# Patient Record
Sex: MALE | Race: WHITE | Employment: OTHER | URBAN - METROPOLITAN AREA
[De-identification: names, ages, dates, MRNs, and addresses within clinical notes are randomized per-mention and may not be internally consistent; named-entity substitution may affect disease eponyms.]

---

## 2017-01-01 ENCOUNTER — LAB ENCOUNTER (OUTPATIENT)
Dept: LAB | Age: 77
End: 2017-01-01
Attending: INTERNAL MEDICINE
Payer: MEDICARE

## 2017-01-01 ENCOUNTER — OFFICE VISIT (OUTPATIENT)
Dept: FAMILY MEDICINE CLINIC | Facility: CLINIC | Age: 77
End: 2017-01-01

## 2017-01-01 ENCOUNTER — APPOINTMENT (OUTPATIENT)
Dept: LAB | Age: 77
End: 2017-01-01
Attending: FAMILY MEDICINE
Payer: MEDICARE

## 2017-01-01 ENCOUNTER — PATIENT MESSAGE (OUTPATIENT)
Dept: FAMILY MEDICINE CLINIC | Facility: CLINIC | Age: 77
End: 2017-01-01

## 2017-01-01 ENCOUNTER — TELEPHONE (OUTPATIENT)
Dept: FAMILY MEDICINE CLINIC | Facility: CLINIC | Age: 77
End: 2017-01-01

## 2017-01-01 ENCOUNTER — HOSPITAL ENCOUNTER (OUTPATIENT)
Dept: GENERAL RADIOLOGY | Age: 77
Discharge: HOME | End: 2017-01-01
Attending: FAMILY MEDICINE
Payer: MEDICARE

## 2017-01-01 ENCOUNTER — APPOINTMENT (OUTPATIENT)
Dept: NUCLEAR MEDICINE | Facility: HOSPITAL | Age: 77
End: 2017-01-01
Attending: EMERGENCY MEDICINE
Payer: MEDICARE

## 2017-01-01 ENCOUNTER — OFFICE VISIT (OUTPATIENT)
Dept: NEUROLOGY | Facility: CLINIC | Age: 77
End: 2017-01-01

## 2017-01-01 ENCOUNTER — HOSPITAL ENCOUNTER (EMERGENCY)
Facility: HOSPITAL | Age: 77
Discharge: HOME | End: 2017-01-01
Attending: EMERGENCY MEDICINE
Payer: MEDICARE

## 2017-01-01 ENCOUNTER — NURSE ONLY (OUTPATIENT)
Dept: ELECTROPHYSIOLOGY | Facility: HOSPITAL | Age: 77
End: 2017-01-01
Attending: FAMILY MEDICINE
Payer: MEDICARE

## 2017-01-01 ENCOUNTER — TELEPHONE (OUTPATIENT)
Dept: NEUROLOGY | Facility: CLINIC | Age: 77
End: 2017-01-01

## 2017-01-01 ENCOUNTER — HOSPITAL ENCOUNTER (OUTPATIENT)
Dept: CT IMAGING | Facility: HOSPITAL | Age: 77
Discharge: HOME | End: 2017-01-01
Attending: FAMILY MEDICINE
Payer: MEDICARE

## 2017-01-01 ENCOUNTER — APPOINTMENT (OUTPATIENT)
Dept: GENERAL RADIOLOGY | Facility: HOSPITAL | Age: 77
End: 2017-01-01
Attending: EMERGENCY MEDICINE
Payer: MEDICARE

## 2017-01-01 ENCOUNTER — HOSPITAL ENCOUNTER (OUTPATIENT)
Dept: LAB | Facility: HOSPITAL | Age: 77
Discharge: HOME | End: 2017-01-01
Attending: INTERNAL MEDICINE
Payer: MEDICARE

## 2017-01-01 ENCOUNTER — LAB ENCOUNTER (OUTPATIENT)
Dept: LAB | Age: 77
End: 2017-01-01
Attending: PHYSICIAN ASSISTANT
Payer: MEDICARE

## 2017-01-01 ENCOUNTER — HOSPITAL ENCOUNTER (OUTPATIENT)
Dept: CV DIAGNOSTICS | Facility: HOSPITAL | Age: 77
Discharge: HOME | End: 2017-01-01
Attending: INTERNAL MEDICINE

## 2017-01-01 VITALS
TEMPERATURE: 99 F | DIASTOLIC BLOOD PRESSURE: 56 MMHG | SYSTOLIC BLOOD PRESSURE: 110 MMHG | HEART RATE: 66 BPM | HEART RATE: 80 BPM | HEIGHT: 66 IN | RESPIRATION RATE: 16 BRPM | BODY MASS INDEX: 23.63 KG/M2 | TEMPERATURE: 98 F | WEIGHT: 142 LBS | OXYGEN SATURATION: 97 % | DIASTOLIC BLOOD PRESSURE: 68 MMHG | SYSTOLIC BLOOD PRESSURE: 94 MMHG | BODY MASS INDEX: 22.82 KG/M2 | WEIGHT: 147 LBS | HEIGHT: 66 IN | RESPIRATION RATE: 16 BRPM

## 2017-01-01 VITALS
BODY MASS INDEX: 22.66 KG/M2 | WEIGHT: 141 LBS | HEIGHT: 66 IN | RESPIRATION RATE: 18 BRPM | TEMPERATURE: 98 F | DIASTOLIC BLOOD PRESSURE: 60 MMHG | SYSTOLIC BLOOD PRESSURE: 98 MMHG | HEART RATE: 79 BPM | OXYGEN SATURATION: 98 %

## 2017-01-01 VITALS
WEIGHT: 152 LBS | HEIGHT: 66 IN | DIASTOLIC BLOOD PRESSURE: 62 MMHG | RESPIRATION RATE: 16 BRPM | BODY MASS INDEX: 24.43 KG/M2 | HEART RATE: 80 BPM | OXYGEN SATURATION: 98 % | TEMPERATURE: 98 F | SYSTOLIC BLOOD PRESSURE: 112 MMHG

## 2017-01-01 VITALS
RESPIRATION RATE: 16 BRPM | DIASTOLIC BLOOD PRESSURE: 60 MMHG | HEIGHT: 66 IN | WEIGHT: 147 LBS | HEART RATE: 80 BPM | SYSTOLIC BLOOD PRESSURE: 112 MMHG | BODY MASS INDEX: 23.63 KG/M2

## 2017-01-01 VITALS
DIASTOLIC BLOOD PRESSURE: 62 MMHG | OXYGEN SATURATION: 97 % | SYSTOLIC BLOOD PRESSURE: 94 MMHG | WEIGHT: 147.06 LBS | TEMPERATURE: 98 F | BODY MASS INDEX: 24 KG/M2 | HEART RATE: 80 BPM | RESPIRATION RATE: 16 BRPM

## 2017-01-01 VITALS
OXYGEN SATURATION: 94 % | DIASTOLIC BLOOD PRESSURE: 78 MMHG | TEMPERATURE: 98 F | HEART RATE: 62 BPM | RESPIRATION RATE: 16 BRPM | WEIGHT: 147 LBS | BODY MASS INDEX: 24 KG/M2 | SYSTOLIC BLOOD PRESSURE: 104 MMHG

## 2017-01-01 VITALS
DIASTOLIC BLOOD PRESSURE: 50 MMHG | HEART RATE: 76 BPM | HEIGHT: 66 IN | WEIGHT: 142 LBS | RESPIRATION RATE: 18 BRPM | OXYGEN SATURATION: 97 % | SYSTOLIC BLOOD PRESSURE: 98 MMHG | TEMPERATURE: 98 F | BODY MASS INDEX: 22.82 KG/M2

## 2017-01-01 VITALS
DIASTOLIC BLOOD PRESSURE: 60 MMHG | TEMPERATURE: 98 F | BODY MASS INDEX: 25 KG/M2 | WEIGHT: 153.19 LBS | RESPIRATION RATE: 18 BRPM | HEART RATE: 80 BPM | SYSTOLIC BLOOD PRESSURE: 110 MMHG

## 2017-01-01 DIAGNOSIS — R33.9 URINE RETENTION: ICD-10-CM

## 2017-01-01 DIAGNOSIS — M54.2 NECK PAIN OF OVER 3 MONTHS DURATION: ICD-10-CM

## 2017-01-01 DIAGNOSIS — I50.20 SYSTOLIC HEART FAILURE, UNSPECIFIED HEART FAILURE CHRONICITY: ICD-10-CM

## 2017-01-01 DIAGNOSIS — I50.9 HF (HEART FAILURE) (HCC): Primary | ICD-10-CM

## 2017-01-01 DIAGNOSIS — R79.9 ELEVATED BUN: ICD-10-CM

## 2017-01-01 DIAGNOSIS — I10 ESSENTIAL HYPERTENSION: ICD-10-CM

## 2017-01-01 DIAGNOSIS — R73.9 HYPERGLYCEMIA: ICD-10-CM

## 2017-01-01 DIAGNOSIS — R60.1 GENERALIZED EDEMA: ICD-10-CM

## 2017-01-01 DIAGNOSIS — K52.1 DIARRHEA DUE TO DRUG: Primary | ICD-10-CM

## 2017-01-01 DIAGNOSIS — R26.81 UNSTEADY GAIT: Primary | ICD-10-CM

## 2017-01-01 DIAGNOSIS — I87.2 VENOUS INSUFFICIENCY OF BOTH LOWER EXTREMITIES: Primary | ICD-10-CM

## 2017-01-01 DIAGNOSIS — E78.5 HYPERLIPIDEMIA, UNSPECIFIED HYPERLIPIDEMIA TYPE: ICD-10-CM

## 2017-01-01 DIAGNOSIS — H61.23 BILATERAL IMPACTED CERUMEN: ICD-10-CM

## 2017-01-01 DIAGNOSIS — N40.0 BENIGN NODULAR PROSTATIC HYPERPLASIA WITHOUT LOWER URINARY TRACT SYMPTOMS: ICD-10-CM

## 2017-01-01 DIAGNOSIS — I50.20 SYSTOLIC HEART FAILURE (HCC): ICD-10-CM

## 2017-01-01 DIAGNOSIS — G45.9 TRANSIENT CEREBRAL ISCHEMIA, UNSPECIFIED TYPE: ICD-10-CM

## 2017-01-01 DIAGNOSIS — I61.9 STROKE DUE TO INTRACEREBRAL HEMORRHAGE (HCC): ICD-10-CM

## 2017-01-01 DIAGNOSIS — N28.9 RENAL INSUFFICIENCY: Primary | ICD-10-CM

## 2017-01-01 DIAGNOSIS — R06.00 DYSPNEA, UNSPECIFIED TYPE: ICD-10-CM

## 2017-01-01 DIAGNOSIS — H10.402 CHRONIC BACTERIAL CONJUNCTIVITIS OF LEFT EYE: ICD-10-CM

## 2017-01-01 DIAGNOSIS — G25.82 STIFF LIMB SYNDROME: ICD-10-CM

## 2017-01-01 DIAGNOSIS — I50.9 CONGESTIVE HEART FAILURE, UNSPECIFIED CONGESTIVE HEART FAILURE CHRONICITY, UNSPECIFIED CONGESTIVE HEART FAILURE TYPE: ICD-10-CM

## 2017-01-01 DIAGNOSIS — R20.2 PARESTHESIAS: ICD-10-CM

## 2017-01-01 DIAGNOSIS — R61 NIGHT SWEATS: Primary | ICD-10-CM

## 2017-01-01 DIAGNOSIS — R26.81 UNSTEADY GAIT: ICD-10-CM

## 2017-01-01 DIAGNOSIS — M10.042 ACUTE IDIOPATHIC GOUT OF LEFT HAND: Primary | ICD-10-CM

## 2017-01-01 DIAGNOSIS — I61.9 STROKE DUE TO INTRACEREBRAL HEMORRHAGE (HCC): Primary | ICD-10-CM

## 2017-01-01 DIAGNOSIS — N18.30 CKD (CHRONIC KIDNEY DISEASE) STAGE 3, GFR 30-59 ML/MIN (HCC): ICD-10-CM

## 2017-01-01 DIAGNOSIS — I50.22 CHRONIC SYSTOLIC CONGESTIVE HEART FAILURE (HCC): ICD-10-CM

## 2017-01-01 DIAGNOSIS — I50.9 HF (HEART FAILURE) (HCC): ICD-10-CM

## 2017-01-01 DIAGNOSIS — N40.1 BENIGN NODULAR PROSTATIC HYPERPLASIA WITH LOWER URINARY TRACT SYMPTOMS: ICD-10-CM

## 2017-01-01 DIAGNOSIS — I48.20 CHRONIC ATRIAL FIBRILLATION (HCC): Primary | ICD-10-CM

## 2017-01-01 DIAGNOSIS — T50.905A WEIGHT LOSS DUE TO MEDICATION: ICD-10-CM

## 2017-01-01 DIAGNOSIS — N18.9 CRI (CHRONIC RENAL INSUFFICIENCY): ICD-10-CM

## 2017-01-01 DIAGNOSIS — I42.0 DILATED CARDIOMYOPATHY (HCC): ICD-10-CM

## 2017-01-01 DIAGNOSIS — Z95.2 HISTORY OF PROSTHETIC MITRAL VALVE: ICD-10-CM

## 2017-01-01 DIAGNOSIS — D64.9 ABSOLUTE ANEMIA: Primary | ICD-10-CM

## 2017-01-01 DIAGNOSIS — J34.89 SINUS DRAINAGE: ICD-10-CM

## 2017-01-01 DIAGNOSIS — R41.3 MEMORY LOSS: ICD-10-CM

## 2017-01-01 DIAGNOSIS — R61 NIGHT SWEATS: ICD-10-CM

## 2017-01-01 DIAGNOSIS — I10 ESSENTIAL HYPERTENSION: Primary | ICD-10-CM

## 2017-01-01 DIAGNOSIS — I48.20 CHRONIC ATRIAL FIBRILLATION (HCC): ICD-10-CM

## 2017-01-01 DIAGNOSIS — IMO0002 DEPRESSION DUE TO STROKE: ICD-10-CM

## 2017-01-01 DIAGNOSIS — F33.1 MODERATE EPISODE OF RECURRENT MAJOR DEPRESSIVE DISORDER (HCC): ICD-10-CM

## 2017-01-01 DIAGNOSIS — R63.4 WEIGHT LOSS DUE TO MEDICATION: ICD-10-CM

## 2017-01-01 DIAGNOSIS — R79.9 ABNORMAL BLOOD CHEMISTRY: ICD-10-CM

## 2017-01-01 DIAGNOSIS — Z00.00 ENCOUNTER FOR ANNUAL HEALTH EXAMINATION: ICD-10-CM

## 2017-01-01 LAB
ALBUMIN SERPL-MCNC: 4.3 G/DL (ref 3.5–4.8)
ALP LIVER SERPL-CCNC: 54 U/L (ref 45–117)
ALT SERPL-CCNC: 27 U/L (ref 17–63)
AST SERPL-CCNC: 16 U/L (ref 15–41)
ATRIAL RATE: 70 BPM
BASOPHILS # BLD AUTO: 0.06 X10(3) UL (ref 0–0.1)
BASOPHILS NFR BLD AUTO: 0.8 %
BILIRUB SERPL-MCNC: 0.5 MG/DL (ref 0.1–2)
BILIRUB UR QL STRIP.AUTO: NEGATIVE
BUN BLD-MCNC: 40 MG/DL (ref 8–20)
BUN BLD-MCNC: 46 MG/DL (ref 8–20)
CALCIUM BLD-MCNC: 9.4 MG/DL (ref 8.3–10.3)
CALCIUM BLD-MCNC: 9.8 MG/DL (ref 8.3–10.3)
CHLORIDE: 106 MMOL/L (ref 101–111)
CHLORIDE: 111 MMOL/L (ref 101–111)
CLARITY UR REFRACT.AUTO: CLEAR
CO2: 26 MMOL/L (ref 22–32)
CO2: 30 MMOL/L (ref 22–32)
COLOR UR AUTO: YELLOW
CREAT BLD-MCNC: 1.81 MG/DL (ref 0.7–1.3)
CREAT BLD-MCNC: 2.36 MG/DL (ref 0.7–1.3)
D-DIMER: 0.57 UG/ML FEU (ref 0–0.49)
EOSINOPHIL # BLD AUTO: 0.28 X10(3) UL (ref 0–0.3)
EOSINOPHIL NFR BLD AUTO: 3.5 %
ERYTHROCYTE [DISTWIDTH] IN BLOOD BY AUTOMATED COUNT: 13.6 % (ref 11.5–16)
GLUCOSE BLD-MCNC: 88 MG/DL (ref 65–99)
GLUCOSE BLD-MCNC: 91 MG/DL (ref 70–99)
GLUCOSE BLD-MCNC: 98 MG/DL (ref 70–99)
GLUCOSE UR STRIP.AUTO-MCNC: NEGATIVE MG/DL
HAV IGM SER QL: 2.3 MG/DL (ref 1.7–3)
HCT VFR BLD AUTO: 40.2 % (ref 37–53)
HGB BLD-MCNC: 13.2 G/DL (ref 13–17)
IMMATURE GRANULOCYTE COUNT: 0.04 X10(3) UL (ref 0–1)
IMMATURE GRANULOCYTE RATIO %: 0.5 %
INR BLD: 1.12 (ref 0.89–1.11)
KETONES UR STRIP.AUTO-MCNC: NEGATIVE MG/DL
LEUKOCYTE ESTERASE UR QL STRIP.AUTO: NEGATIVE
LYMPHOCYTES # BLD AUTO: 2.41 X10(3) UL (ref 0.9–4)
LYMPHOCYTES NFR BLD AUTO: 30.2 %
M PROTEIN MFR SERPL ELPH: 7.5 G/DL (ref 6.1–8.3)
MCH RBC QN AUTO: 30.3 PG (ref 27–33.2)
MCHC RBC AUTO-ENTMCNC: 32.8 G/DL (ref 31–37)
MCV RBC AUTO: 92.4 FL (ref 80–99)
MONOCYTES # BLD AUTO: 0.78 X10(3) UL (ref 0.1–0.6)
MONOCYTES NFR BLD AUTO: 9.8 %
NEUTROPHIL ABS PRELIM: 4.4 X10 (3) UL (ref 1.3–6.7)
NEUTROPHILS # BLD AUTO: 4.4 X10(3) UL (ref 1.3–6.7)
NEUTROPHILS NFR BLD AUTO: 55.2 %
NITRITE UR QL STRIP.AUTO: NEGATIVE
PH UR STRIP.AUTO: 5 [PH] (ref 4.5–8)
PHOSPHATE SERPL-MCNC: 3.5 MG/DL (ref 2.5–4.9)
PLATELET # BLD AUTO: 186 10(3)UL (ref 150–450)
POTASSIUM SERPL-SCNC: 4.2 MMOL/L (ref 3.6–5.1)
POTASSIUM SERPL-SCNC: 4.8 MMOL/L (ref 3.6–5.1)
PRO-BETA NATRIURETIC PEPTIDE: 3029 PG/ML (ref ?–450)
PROT UR STRIP.AUTO-MCNC: NEGATIVE MG/DL
PSA SERPL DL<=0.01 NG/ML-MCNC: 14.5 SECONDS (ref 12–14.3)
Q-T INTERVAL: 462 MS
QRS DURATION: 200 MS
QTC CALCULATION (BEZET): 529 MS
R AXIS: 137 DEGREES
RBC # BLD AUTO: 4.35 X10(6)UL (ref 3.8–5.8)
RBC UR QL AUTO: NEGATIVE
RED CELL DISTRIBUTION WIDTH-SD: 45.7 FL (ref 35.1–46.3)
SODIUM SERPL-SCNC: 143 MMOL/L (ref 136–144)
SODIUM SERPL-SCNC: 147 MMOL/L (ref 136–144)
SP GR UR STRIP.AUTO: 1 (ref 1–1.03)
T AXIS: -2 DEGREES
TROPONIN: <0.046 NG/ML (ref ?–0.05)
UROBILINOGEN UR STRIP.AUTO-MCNC: <2 MG/DL
VENTRICULAR RATE: 79 BPM
WBC # BLD AUTO: 8 X10(3) UL (ref 4–13)

## 2017-01-01 PROCEDURE — 85025 COMPLETE CBC W/AUTO DIFF WBC: CPT

## 2017-01-01 PROCEDURE — 85610 PROTHROMBIN TIME: CPT | Performed by: EMERGENCY MEDICINE

## 2017-01-01 PROCEDURE — 80053 COMPREHEN METABOLIC PANEL: CPT

## 2017-01-01 PROCEDURE — 36415 COLL VENOUS BLD VENIPUNCTURE: CPT

## 2017-01-01 PROCEDURE — 90653 IIV ADJUVANT VACCINE IM: CPT | Performed by: FAMILY MEDICINE

## 2017-01-01 PROCEDURE — 70486 CT MAXILLOFACIAL W/O DYE: CPT

## 2017-01-01 PROCEDURE — 80048 BASIC METABOLIC PNL TOTAL CA: CPT

## 2017-01-01 PROCEDURE — 85025 COMPLETE CBC W/AUTO DIFF WBC: CPT | Performed by: EMERGENCY MEDICINE

## 2017-01-01 PROCEDURE — 84443 ASSAY THYROID STIM HORMONE: CPT

## 2017-01-01 PROCEDURE — 72040 X-RAY EXAM NECK SPINE 2-3 VW: CPT

## 2017-01-01 PROCEDURE — 80061 LIPID PANEL: CPT

## 2017-01-01 PROCEDURE — 83540 ASSAY OF IRON: CPT

## 2017-01-01 PROCEDURE — 99213 OFFICE O/P EST LOW 20 MIN: CPT | Performed by: FAMILY MEDICINE

## 2017-01-01 PROCEDURE — 93005 ELECTROCARDIOGRAM TRACING: CPT

## 2017-01-01 PROCEDURE — 99214 OFFICE O/P EST MOD 30 MIN: CPT | Performed by: FAMILY MEDICINE

## 2017-01-01 PROCEDURE — 99214 OFFICE O/P EST MOD 30 MIN: CPT | Performed by: OTHER

## 2017-01-01 PROCEDURE — 83036 HEMOGLOBIN GLYCOSYLATED A1C: CPT

## 2017-01-01 PROCEDURE — 82728 ASSAY OF FERRITIN: CPT

## 2017-01-01 PROCEDURE — 83550 IRON BINDING TEST: CPT

## 2017-01-01 PROCEDURE — G0008 ADMIN INFLUENZA VIRUS VAC: HCPCS | Performed by: FAMILY MEDICINE

## 2017-01-01 PROCEDURE — 85045 AUTOMATED RETICULOCYTE COUNT: CPT

## 2017-01-01 PROCEDURE — 71010 XR CHEST AP PORTABLE  (CPT=71010): CPT | Performed by: EMERGENCY MEDICINE

## 2017-01-01 PROCEDURE — 85025 COMPLETE CBC W/AUTO DIFF WBC: CPT | Performed by: INTERNAL MEDICINE

## 2017-01-01 PROCEDURE — 83735 ASSAY OF MAGNESIUM: CPT

## 2017-01-01 PROCEDURE — 83880 ASSAY OF NATRIURETIC PEPTIDE: CPT

## 2017-01-01 PROCEDURE — 99285 EMERGENCY DEPT VISIT HI MDM: CPT

## 2017-01-01 PROCEDURE — 84436 ASSAY OF TOTAL THYROXINE: CPT | Performed by: INTERNAL MEDICINE

## 2017-01-01 PROCEDURE — 95816 EEG AWAKE AND DROWSY: CPT | Performed by: OTHER

## 2017-01-01 PROCEDURE — 84100 ASSAY OF PHOSPHORUS: CPT

## 2017-01-01 PROCEDURE — 84443 ASSAY THYROID STIM HORMONE: CPT | Performed by: INTERNAL MEDICINE

## 2017-01-01 PROCEDURE — G0439 PPPS, SUBSEQ VISIT: HCPCS | Performed by: FAMILY MEDICINE

## 2017-01-01 PROCEDURE — 84484 ASSAY OF TROPONIN QUANT: CPT

## 2017-01-01 PROCEDURE — 84480 ASSAY TRIIODOTHYRONINE (T3): CPT | Performed by: INTERNAL MEDICINE

## 2017-01-01 PROCEDURE — 93010 ELECTROCARDIOGRAM REPORT: CPT

## 2017-01-01 PROCEDURE — 83880 ASSAY OF NATRIURETIC PEPTIDE: CPT | Performed by: EMERGENCY MEDICINE

## 2017-01-01 PROCEDURE — 36415 COLL VENOUS BLD VENIPUNCTURE: CPT | Performed by: INTERNAL MEDICINE

## 2017-01-01 PROCEDURE — 80053 COMPREHEN METABOLIC PANEL: CPT | Performed by: INTERNAL MEDICINE

## 2017-01-01 PROCEDURE — 84550 ASSAY OF BLOOD/URIC ACID: CPT | Performed by: INTERNAL MEDICINE

## 2017-01-01 PROCEDURE — 81003 URINALYSIS AUTO W/O SCOPE: CPT | Performed by: EMERGENCY MEDICINE

## 2017-01-01 PROCEDURE — 85378 FIBRIN DEGRADE SEMIQUANT: CPT | Performed by: EMERGENCY MEDICINE

## 2017-01-01 PROCEDURE — 82962 GLUCOSE BLOOD TEST: CPT

## 2017-01-01 PROCEDURE — 78582 LUNG VENTILAT&PERFUS IMAGING: CPT | Performed by: EMERGENCY MEDICINE

## 2017-01-01 PROCEDURE — 80053 COMPREHEN METABOLIC PANEL: CPT | Performed by: EMERGENCY MEDICINE

## 2017-01-01 PROCEDURE — 83735 ASSAY OF MAGNESIUM: CPT | Performed by: INTERNAL MEDICINE

## 2017-01-01 PROCEDURE — 84484 ASSAY OF TROPONIN QUANT: CPT | Performed by: EMERGENCY MEDICINE

## 2017-01-01 PROCEDURE — 96374 THER/PROPH/DIAG INJ IV PUSH: CPT

## 2017-01-01 RX ORDER — SPIRONOLACTONE 25 MG/1
12.5 TABLET ORAL DAILY
Status: CANCELLED | OUTPATIENT
Start: 2017-01-01

## 2017-01-01 RX ORDER — FINASTERIDE 5 MG/1
TABLET, FILM COATED ORAL
Qty: 30 TABLET | Refills: 0 | Status: SHIPPED | OUTPATIENT
Start: 2017-01-01 | End: 2017-01-01

## 2017-01-01 RX ORDER — FINASTERIDE 5 MG/1
TABLET, FILM COATED ORAL
Qty: 90 TABLET | Refills: 0 | Status: SHIPPED | OUTPATIENT
Start: 2017-01-01 | End: 2018-01-01

## 2017-01-01 RX ORDER — CITALOPRAM 10 MG/1
2.5 TABLET ORAL DAILY
COMMUNITY
End: 2017-01-01

## 2017-01-01 RX ORDER — JUNIPER 300 MG
CAPSULE ORAL
Qty: 30 CAPSULE | Refills: 0 | COMMUNITY
Start: 2017-01-01 | End: 2017-01-01

## 2017-01-01 RX ORDER — METOPROLOL SUCCINATE 100 MG/1
100 TABLET, EXTENDED RELEASE ORAL 2 TIMES DAILY
Status: CANCELLED | OUTPATIENT
Start: 2017-01-01

## 2017-01-01 RX ORDER — SERTRALINE HYDROCHLORIDE 25 MG/1
25 TABLET, FILM COATED ORAL DAILY
Qty: 30 TABLET | Refills: 0 | Status: SHIPPED | OUTPATIENT
Start: 2017-01-01 | End: 2017-01-01

## 2017-01-01 RX ORDER — CITALOPRAM HYDROBROMIDE 10 MG/1
TABLET ORAL
Qty: 45 TABLET | Refills: 0 | OUTPATIENT
Start: 2017-01-01

## 2017-01-01 RX ORDER — TORSEMIDE 5 MG/1
10 TABLET ORAL AS NEEDED
Status: ON HOLD | COMMUNITY
End: 2018-01-01

## 2017-01-01 RX ORDER — FINASTERIDE 5 MG/1
TABLET, FILM COATED ORAL
Qty: 90 TABLET | Refills: 0 | Status: SHIPPED | OUTPATIENT
Start: 2017-01-01 | End: 2017-01-01

## 2017-01-01 RX ORDER — SERTRALINE HYDROCHLORIDE 25 MG/1
TABLET, FILM COATED ORAL
Qty: 30 TABLET | Refills: 0 | Status: SHIPPED | OUTPATIENT
Start: 2017-01-01 | End: 2017-01-01

## 2017-01-01 RX ORDER — LISINOPRIL 10 MG/1
10 TABLET ORAL 2 TIMES DAILY
Qty: 60 TABLET | Refills: 2 | COMMUNITY
Start: 2017-01-01 | End: 2017-01-01

## 2017-01-01 RX ORDER — TORSEMIDE 20 MG/1
20 TABLET ORAL DAILY
COMMUNITY
End: 2017-01-01

## 2017-01-01 RX ORDER — ACETAMINOPHEN 325 MG/1
650 TABLET ORAL EVERY 6 HOURS PRN
Status: CANCELLED | OUTPATIENT
Start: 2017-01-01

## 2017-01-01 RX ORDER — FINASTERIDE 5 MG/1
5 TABLET, FILM COATED ORAL
Qty: 30 TABLET | Refills: 12 | COMMUNITY
Start: 2017-01-01 | End: 2017-01-01

## 2017-01-01 RX ORDER — BACLOFEN 10 MG/1
TABLET ORAL
Qty: 135 TABLET | Refills: 0 | Status: SHIPPED | OUTPATIENT
Start: 2017-01-01 | End: 2018-01-01

## 2017-01-01 RX ORDER — FUROSEMIDE 10 MG/ML
40 INJECTION INTRAMUSCULAR; INTRAVENOUS ONCE
Status: DISCONTINUED | OUTPATIENT
Start: 2017-01-01 | End: 2017-01-01

## 2017-01-01 RX ORDER — BACLOFEN 10 MG/1
TABLET ORAL
Qty: 135 TABLET | Refills: 0 | Status: SHIPPED | OUTPATIENT
Start: 2017-01-01 | End: 2017-01-01

## 2017-01-01 RX ORDER — LISINOPRIL 20 MG/1
20 TABLET ORAL
Status: ON HOLD | COMMUNITY
End: 2018-01-01

## 2017-01-01 RX ORDER — FUROSEMIDE 10 MG/ML
80 INJECTION INTRAMUSCULAR; INTRAVENOUS
Status: CANCELLED | OUTPATIENT
Start: 2017-01-01

## 2017-01-01 RX ORDER — TORSEMIDE 20 MG/1
10 TABLET ORAL DAILY
Qty: 45 TABLET | Refills: 0 | Status: SHIPPED
Start: 2017-01-01 | End: 2017-01-01

## 2017-01-01 RX ORDER — SERTRALINE HYDROCHLORIDE 25 MG/1
TABLET, FILM COATED ORAL
Qty: 90 TABLET | Refills: 0 | OUTPATIENT
Start: 2017-01-01

## 2017-01-01 RX ORDER — PENICILLIN V POTASSIUM 500 MG/1
500 TABLET ORAL 4 TIMES DAILY
Qty: 28 TABLET | Refills: 0 | Status: SHIPPED | OUTPATIENT
Start: 2017-01-01 | End: 2017-01-01

## 2017-01-01 RX ORDER — LISINOPRIL 10 MG/1
10 TABLET ORAL 2 TIMES DAILY
Status: CANCELLED | OUTPATIENT
Start: 2017-01-01

## 2017-01-01 RX ORDER — COLCHICINE 0.6 MG/1
0.6 TABLET ORAL DAILY
Qty: 10 TABLET | Refills: 0 | Status: ON HOLD | OUTPATIENT
Start: 2017-01-01 | End: 2018-01-01 | Stop reason: ALTCHOICE

## 2017-01-01 RX ORDER — SERTRALINE HYDROCHLORIDE 25 MG/1
TABLET, FILM COATED ORAL
Qty: 30 TABLET | Refills: 0 | Status: SHIPPED | OUTPATIENT
Start: 2017-01-01 | End: 2017-01-01 | Stop reason: SINTOL

## 2017-01-01 RX ORDER — ESCITALOPRAM OXALATE 10 MG/1
5 TABLET ORAL DAILY
Status: CANCELLED | OUTPATIENT
Start: 2017-01-01

## 2017-01-01 RX ORDER — TORSEMIDE 20 MG/1
40 TABLET ORAL
Status: CANCELLED | OUTPATIENT
Start: 2017-01-01

## 2017-01-01 RX ORDER — LISINOPRIL 20 MG/1
20 TABLET ORAL 2 TIMES DAILY
Qty: 180 TABLET | Refills: 1 | Status: SHIPPED | OUTPATIENT
Start: 2017-01-01 | End: 2017-01-01

## 2017-01-01 RX ORDER — GENTAMICIN SULFATE 3 MG/ML
1 SOLUTION/ DROPS OPHTHALMIC 4 TIMES DAILY
Qty: 5 ML | Refills: 0 | Status: SHIPPED | OUTPATIENT
Start: 2017-01-01 | End: 2017-01-01

## 2017-01-01 RX ORDER — ASPIRIN 325 MG
325 TABLET, DELAYED RELEASE (ENTERIC COATED) ORAL DAILY
Status: CANCELLED | OUTPATIENT
Start: 2017-01-01

## 2017-01-01 RX ORDER — MAGNESIUM OXIDE 400 MG (241.3 MG MAGNESIUM) TABLET
3 TABLET NIGHTLY PRN
Status: CANCELLED | OUTPATIENT
Start: 2017-01-01

## 2017-01-01 RX ORDER — ONDANSETRON 2 MG/ML
8 INJECTION INTRAMUSCULAR; INTRAVENOUS EVERY 6 HOURS PRN
Status: CANCELLED | OUTPATIENT
Start: 2017-01-01

## 2017-01-01 RX ORDER — FUROSEMIDE 10 MG/ML
80 INJECTION INTRAMUSCULAR; INTRAVENOUS ONCE
Status: COMPLETED | OUTPATIENT
Start: 2017-01-01 | End: 2017-01-01

## 2017-01-01 RX ORDER — FINASTERIDE 5 MG/1
5 TABLET, FILM COATED ORAL
Status: CANCELLED | OUTPATIENT
Start: 2017-01-01

## 2017-01-01 RX ORDER — DIGOXIN 125 MCG
125 TABLET ORAL SEE ADMIN INSTRUCTIONS
Status: CANCELLED | OUTPATIENT
Start: 2017-01-01

## 2017-01-04 ENCOUNTER — OFFICE VISIT (OUTPATIENT)
Dept: FAMILY MEDICINE CLINIC | Facility: CLINIC | Age: 77
End: 2017-01-04

## 2017-01-04 ENCOUNTER — APPOINTMENT (OUTPATIENT)
Dept: LAB | Age: 77
End: 2017-01-04
Attending: FAMILY MEDICINE
Payer: MEDICARE

## 2017-01-04 ENCOUNTER — PRIOR ORIGINAL RECORDS (OUTPATIENT)
Dept: OTHER | Age: 77
End: 2017-01-04

## 2017-01-04 VITALS
DIASTOLIC BLOOD PRESSURE: 68 MMHG | OXYGEN SATURATION: 96 % | TEMPERATURE: 98 F | RESPIRATION RATE: 20 BRPM | HEIGHT: 66 IN | HEART RATE: 76 BPM | SYSTOLIC BLOOD PRESSURE: 114 MMHG

## 2017-01-04 DIAGNOSIS — I61.9 STROKE DUE TO INTRACEREBRAL HEMORRHAGE (HCC): Primary | ICD-10-CM

## 2017-01-04 DIAGNOSIS — I10 ESSENTIAL HYPERTENSION: ICD-10-CM

## 2017-01-04 DIAGNOSIS — N18.30 CKD (CHRONIC KIDNEY DISEASE), STAGE III (HCC): ICD-10-CM

## 2017-01-04 LAB
BUN BLD-MCNC: 73 MG/DL (ref 8–20)
CALCIUM BLD-MCNC: 10.1 MG/DL (ref 8.3–10.3)
CHLORIDE: 114 MMOL/L (ref 101–111)
CO2: 25 MMOL/L (ref 22–32)
CREAT BLD-MCNC: 2.77 MG/DL (ref 0.7–1.3)
GLUCOSE BLD-MCNC: 144 MG/DL (ref 70–99)
POTASSIUM SERPL-SCNC: 3.9 MMOL/L (ref 3.6–5.1)
SODIUM SERPL-SCNC: 148 MMOL/L (ref 136–144)

## 2017-01-04 PROCEDURE — 36415 COLL VENOUS BLD VENIPUNCTURE: CPT

## 2017-01-04 PROCEDURE — 80048 BASIC METABOLIC PNL TOTAL CA: CPT

## 2017-01-04 PROCEDURE — 99213 OFFICE O/P EST LOW 20 MIN: CPT | Performed by: FAMILY MEDICINE

## 2017-01-04 NOTE — HISTORICAL OFFICE NOTE
BRE BEAN  : 1940  ACCOUNT:  [id number]  [phone]  PCP: Dr. Dickson Johnson     TODAY'S DATE: 2016  DICTATED BY:  Beth Capone M.D. ]    CHIEF COMPLAINT: [Followup of .  Heart failure, systolic.]    HPI:    [On 2016, prudencio Kirkpatrick 7 149, Height -   66 , BMI - 24.0 ]    CONS: comfortable. HEAD/FACE: normocephalic. EYES: Severe strabismus. ENT: mucosa pink and moist. NECK: jugular venous pressure not elevated. RESP: clear to auscultation and percussion. CHEST/BREASTS: cheloid.  GI: no he capsule by mouth daily                 12/04/15 Vitamin D             2000UNIT  1 tablet by mouth daily                  05/15/15 Finasteride           5MG       1 tab daily                              05/08/14 Vitamin B-Complex               1 tab daily

## 2017-01-04 NOTE — PROGRESS NOTES
[place] paperwork  Wife needs [facility] paperwork due to [address] stroke and need for assistance with office visits and medical care coordination. He has poor vision after the stroke and treatments. He is unable to drive.     In early November he saw Dr. Marcos Jimenes for

## 2017-01-06 ENCOUNTER — HOSPITAL ENCOUNTER (OUTPATIENT)
Dept: INTERVENTIONAL RADIOLOGY/VASCULAR | Facility: HOSPITAL | Age: 77
Discharge: HOME | End: 2017-01-06
Attending: INTERNAL MEDICINE | Admitting: INTERNAL MEDICINE
Payer: MEDICARE

## 2017-01-06 VITALS
SYSTOLIC BLOOD PRESSURE: 110 MMHG | TEMPERATURE: 98 F | DIASTOLIC BLOOD PRESSURE: 65 MMHG | OXYGEN SATURATION: 100 % | HEART RATE: 80 BPM | RESPIRATION RATE: 15 BRPM

## 2017-01-06 DIAGNOSIS — Z45.02 ICD (IMPLANTABLE CARDIOVERTER-DEFIBRILLATOR) BATTERY DEPLETION: ICD-10-CM

## 2017-01-06 PROCEDURE — 33227 REMOVE&REPLACE PM GEN SINGL: CPT

## 2017-01-06 PROCEDURE — 0JH604Z INSERTION OF PACEMAKER, SINGLE CHAMBER INTO CHEST SUBCUTANEOUS TISSUE AND FASCIA, OPEN APPROACH: ICD-10-PCS | Performed by: INTERNAL MEDICINE

## 2017-01-06 PROCEDURE — 0JPT0PZ REMOVAL OF CARDIAC RHYTHM RELATED DEVICE FROM TRUNK SUBCUTANEOUS TISSUE AND FASCIA, OPEN APPROACH: ICD-10-PCS | Performed by: INTERNAL MEDICINE

## 2017-01-06 PROCEDURE — 99153 MOD SED SAME PHYS/QHP EA: CPT

## 2017-01-06 PROCEDURE — 99152 MOD SED SAME PHYS/QHP 5/>YRS: CPT

## 2017-01-06 RX ORDER — CHLORHEXIDINE GLUCONATE 4 G/100ML
30 SOLUTION TOPICAL
Status: DISCONTINUED | OUTPATIENT
Start: 2017-01-07 | End: 2017-01-06

## 2017-01-06 RX ORDER — MIDAZOLAM HYDROCHLORIDE 1 MG/ML
INJECTION INTRAMUSCULAR; INTRAVENOUS
Status: COMPLETED
Start: 2017-01-06 | End: 2017-01-06

## 2017-01-06 RX ORDER — LIDOCAINE HYDROCHLORIDE 10 MG/ML
INJECTION, SOLUTION INFILTRATION; PERINEURAL
Status: COMPLETED
Start: 2017-01-06 | End: 2017-01-06

## 2017-01-06 RX ORDER — BACITRACIN 50000 [USP'U]/1
INJECTION, POWDER, LYOPHILIZED, FOR SOLUTION INTRAMUSCULAR
Status: COMPLETED
Start: 2017-01-06 | End: 2017-01-06

## 2017-01-06 RX ORDER — SODIUM CHLORIDE 9 MG/ML
INJECTION, SOLUTION INTRAVENOUS CONTINUOUS
Status: DISCONTINUED | OUTPATIENT
Start: 2017-01-06 | End: 2017-01-06

## 2017-01-06 RX ORDER — ONDANSETRON 2 MG/ML
4 INJECTION INTRAMUSCULAR; INTRAVENOUS EVERY 6 HOURS PRN
Status: DISCONTINUED | OUTPATIENT
Start: 2017-01-06 | End: 2017-01-06

## 2017-01-06 RX ADMIN — SODIUM CHLORIDE: 9 INJECTION, SOLUTION INTRAVENOUS at 10:45:00

## 2017-01-06 NOTE — PROGRESS NOTES
Patient is a/oi x 4; hemodynamically stable/neurologically intact; iv site removed intact; left chest wall incision intact; patient's spouse verbalized understanding of dc instructions.

## 2017-01-06 NOTE — PROCEDURES
OPERATION(S) PERFORMED:   1. CRT Pacemaker implant.    2. ICD generator removal.     : Clarice Walters MD  INDICATION: Device at SANDRA  COMPLICATIONS: None     ESTIMATED BLOOD LOSS: Minimal.     METHODS: The patient was brought to the outpatient cardiac te

## 2017-01-07 ENCOUNTER — TELEPHONE (OUTPATIENT)
Dept: FAMILY MEDICINE CLINIC | Facility: CLINIC | Age: 77
End: 2017-01-07

## 2017-01-07 DIAGNOSIS — R79.9 ELEVATED BUN: ICD-10-CM

## 2017-01-07 DIAGNOSIS — R79.9 ABNORMAL BLOOD CHEMISTRY: Primary | ICD-10-CM

## 2017-01-07 NOTE — TELEPHONE ENCOUNTER
----- Message from Yulisa Elias MD sent at 1/4/2017 12:17 PM CST -----  We did this to look at his potassium level fearing it might be elevated, however it is 1 of the only things that is normal.  This is the highest creatinine level in the last year.

## 2017-01-09 ENCOUNTER — PRIOR ORIGINAL RECORDS (OUTPATIENT)
Dept: OTHER | Age: 77
End: 2017-01-09

## 2017-01-09 ENCOUNTER — TELEPHONE (OUTPATIENT)
Dept: FAMILY MEDICINE CLINIC | Facility: CLINIC | Age: 77
End: 2017-01-09

## 2017-01-09 NOTE — TELEPHONE ENCOUNTER
Diarrhea all night. Going on all week. bp low :82/52. Hold hydralazine, spironolactone, and torsemide. If bp get high, take hydralazine right away.    Started immodeum this am.

## 2017-01-10 ENCOUNTER — HOSPITAL ENCOUNTER (OUTPATIENT)
Dept: LAB | Facility: HOSPITAL | Age: 77
Discharge: HOME | End: 2017-01-10
Attending: FAMILY MEDICINE
Payer: MEDICARE

## 2017-01-10 ENCOUNTER — PRIOR ORIGINAL RECORDS (OUTPATIENT)
Dept: OTHER | Age: 77
End: 2017-01-10

## 2017-01-10 DIAGNOSIS — I10 ESSENTIAL HYPERTENSION: ICD-10-CM

## 2017-01-10 LAB
ALBUMIN SERPL-MCNC: 3.7 G/DL (ref 3.5–4.8)
ALBUMIN: 3.7 G/DL
ALKALINE PHOSPHATATE(ALK PHOS): 41 IU/L
ALP LIVER SERPL-CCNC: 41 U/L (ref 45–117)
ALT SERPL-CCNC: 14 U/L (ref 17–63)
AST SERPL-CCNC: 16 U/L (ref 15–41)
BASOPHILS # BLD AUTO: 0.06 X10(3) UL (ref 0–0.1)
BASOPHILS NFR BLD AUTO: 1 %
BILIRUB SERPL-MCNC: 0.4 MG/DL (ref 0.1–2)
BILIRUBIN TOTAL: 0.4 MG/DL
BUN BLD-MCNC: 67 MG/DL (ref 8–20)
BUN: 67 MG/DL
CALCIUM BLD-MCNC: 9.7 MG/DL (ref 8.3–10.3)
CALCIUM: 9.7 MG/DL
CHLORIDE: 111 MEQ/L
CHLORIDE: 111 MMOL/L (ref 101–111)
CO2: 23 MMOL/L (ref 22–32)
CREAT BLD-MCNC: 2.01 MG/DL (ref 0.7–1.3)
CREATININE, SERUM: 2.01 MG/DL
EOSINOPHIL # BLD AUTO: 0.16 X10(3) UL (ref 0–0.3)
EOSINOPHIL NFR BLD AUTO: 2.6 %
ERYTHROCYTE [DISTWIDTH] IN BLOOD BY AUTOMATED COUNT: 13.5 % (ref 11.5–16)
GLUCOSE BLD-MCNC: 98 MG/DL (ref 70–99)
GLUCOSE: 98 MG/DL
HCT VFR BLD AUTO: 36.2 % (ref 37–53)
HEMATOCRIT: 36.2 %
HEMOGLOBIN: 11.7 G/DL
HGB BLD-MCNC: 11.7 G/DL (ref 13–17)
IMMATURE GRANULOCYTE COUNT: 0.08 X10(3) UL (ref 0–1)
IMMATURE GRANULOCYTE RATIO %: 1.3 %
LYMPHOCYTES # BLD AUTO: 0.99 X10(3) UL (ref 0.9–4)
LYMPHOCYTES NFR BLD AUTO: 16 %
M PROTEIN MFR SERPL ELPH: 6.5 G/DL (ref 6.1–8.3)
MCH RBC QN AUTO: 32 PG (ref 27–33.2)
MCHC RBC AUTO-ENTMCNC: 32.3 G/DL (ref 31–37)
MCV RBC AUTO: 98.9 FL (ref 80–99)
MONOCYTES # BLD AUTO: 0.61 X10(3) UL (ref 0.1–0.6)
MONOCYTES NFR BLD AUTO: 9.9 %
NEUTROPHIL ABS PRELIM: 4.28 X10 (3) UL (ref 1.3–6.7)
NEUTROPHILS # BLD AUTO: 4.28 X10(3) UL (ref 1.3–6.7)
NEUTROPHILS NFR BLD AUTO: 69.2 %
PLATELET # BLD AUTO: 148 10(3)UL (ref 150–450)
PLATELETS: 148 K/UL
POTASSIUM SERPL-SCNC: 4.2 MMOL/L (ref 3.6–5.1)
POTASSIUM, SERUM: 4.2 MEQ/L
PROTEIN, TOTAL: 6.5 G/DL
RBC # BLD AUTO: 3.66 X10(6)UL (ref 3.8–5.8)
RED BLOOD COUNT: 3.66 X 10-6/U
RED CELL DISTRIBUTION WIDTH-SD: 49.1 FL (ref 35.1–46.3)
SGOT (AST): 16 IU/L
SGPT (ALT): 14 IU/L
SODIUM SERPL-SCNC: 142 MMOL/L (ref 136–144)
SODIUM: 142 MEQ/L
WBC # BLD AUTO: 6.2 X10(3) UL (ref 4–13)
WHITE BLOOD COUNT: 6.2 X 10-3/U

## 2017-01-10 PROCEDURE — 36415 COLL VENOUS BLD VENIPUNCTURE: CPT | Performed by: INTERNAL MEDICINE

## 2017-01-10 PROCEDURE — 85025 COMPLETE CBC W/AUTO DIFF WBC: CPT | Performed by: INTERNAL MEDICINE

## 2017-01-10 PROCEDURE — 80053 COMPREHEN METABOLIC PANEL: CPT | Performed by: FAMILY MEDICINE

## 2017-01-11 LAB
BUN: 73 MG/DL
CALCIUM: 10.1 MG/DL
CHLORIDE: 114 MEQ/L
CREATININE, SERUM: 2.77 MG/DL
GLUCOSE: 144 MG/DL
POTASSIUM, SERUM: 3.9 MEQ/L
SODIUM: 148 MEQ/L

## 2017-01-19 ENCOUNTER — LAB ENCOUNTER (OUTPATIENT)
Dept: LAB | Age: 77
End: 2017-01-19
Attending: NEUROLOGICAL SURGERY
Payer: MEDICARE

## 2017-01-19 DIAGNOSIS — I50.30 DIASTOLIC HEART FAILURE (HCC): Primary | ICD-10-CM

## 2017-01-19 DIAGNOSIS — N18.9 ANEMIA IN CHRONIC KIDNEY DISEASE(285.21): ICD-10-CM

## 2017-01-19 DIAGNOSIS — D63.1 ANEMIA IN CHRONIC KIDNEY DISEASE(285.21): ICD-10-CM

## 2017-01-19 LAB
BUN BLD-MCNC: 45 MG/DL (ref 8–20)
CALCIUM BLD-MCNC: 9.4 MG/DL (ref 8.3–10.3)
CHLORIDE: 112 MMOL/L (ref 101–111)
CO2: 25 MMOL/L (ref 22–32)
CREAT BLD-MCNC: 1.72 MG/DL (ref 0.7–1.3)
GLUCOSE BLD-MCNC: 98 MG/DL (ref 70–99)
POTASSIUM SERPL-SCNC: 4.5 MMOL/L (ref 3.6–5.1)
SODIUM SERPL-SCNC: 143 MMOL/L (ref 136–144)

## 2017-01-19 PROCEDURE — 80048 BASIC METABOLIC PNL TOTAL CA: CPT

## 2017-01-19 PROCEDURE — 36415 COLL VENOUS BLD VENIPUNCTURE: CPT

## 2017-01-20 ENCOUNTER — PRIOR ORIGINAL RECORDS (OUTPATIENT)
Dept: OTHER | Age: 77
End: 2017-01-20

## 2017-01-23 LAB
BUN: 45 MG/DL
CALCIUM: 9.4 MG/DL
CHLORIDE: 112 MEQ/L
CREATININE, SERUM: 1.72 MG/DL
GLUCOSE: 98 MG/DL
POTASSIUM, SERUM: 4.5 MEQ/L
SODIUM: 143 MEQ/L

## 2017-02-17 ENCOUNTER — LAB ENCOUNTER (OUTPATIENT)
Dept: LAB | Age: 77
End: 2017-02-17
Attending: INTERNAL MEDICINE
Payer: MEDICARE

## 2017-02-17 ENCOUNTER — PRIOR ORIGINAL RECORDS (OUTPATIENT)
Dept: OTHER | Age: 77
End: 2017-02-17

## 2017-02-17 DIAGNOSIS — I42.0 CONGESTIVE CARDIOMYOPATHY (HCC): ICD-10-CM

## 2017-02-17 DIAGNOSIS — I48.20 CHRONIC ATRIAL FIBRILLATION (HCC): ICD-10-CM

## 2017-02-17 DIAGNOSIS — D63.1 ANEMIA IN CHRONIC KIDNEY DISEASE(285.21): ICD-10-CM

## 2017-02-17 DIAGNOSIS — Z95.2 HEART VALVE REPLACED BY TRANSPLANT: ICD-10-CM

## 2017-02-17 DIAGNOSIS — N18.9 ANEMIA IN CHRONIC KIDNEY DISEASE(285.21): ICD-10-CM

## 2017-02-17 DIAGNOSIS — I50.9 CHF (CONGESTIVE HEART FAILURE) (HCC): Primary | ICD-10-CM

## 2017-02-17 DIAGNOSIS — R03.0 ELEVATED BLOOD PRESSURE READING WITHOUT DIAGNOSIS OF HYPERTENSION: ICD-10-CM

## 2017-02-17 DIAGNOSIS — I50.20 SYSTOLIC HEART FAILURE (HCC): ICD-10-CM

## 2017-02-17 DIAGNOSIS — I11.9 MALIGNANT HYPERTENSIVE HEART DISEASE WITHOUT HEART FAILURE: ICD-10-CM

## 2017-02-17 LAB
BUN BLD-MCNC: 49 MG/DL (ref 8–20)
CALCIUM BLD-MCNC: 9.9 MG/DL (ref 8.3–10.3)
CHLORIDE: 112 MMOL/L (ref 101–111)
CO2: 27 MMOL/L (ref 22–32)
CREAT BLD-MCNC: 1.9 MG/DL (ref 0.7–1.3)
GLUCOSE BLD-MCNC: 105 MG/DL (ref 70–99)
HAV IGM SER QL: 2.5 MG/DL (ref 1.7–3)
POTASSIUM SERPL-SCNC: 4.3 MMOL/L (ref 3.6–5.1)
SODIUM SERPL-SCNC: 144 MMOL/L (ref 136–144)

## 2017-02-17 PROCEDURE — 36415 COLL VENOUS BLD VENIPUNCTURE: CPT

## 2017-02-17 PROCEDURE — 83735 ASSAY OF MAGNESIUM: CPT

## 2017-02-17 PROCEDURE — 80048 BASIC METABOLIC PNL TOTAL CA: CPT

## 2017-02-20 LAB
BUN: 49 MG/DL
CALCIUM: 9.9 MG/DL
CHLORIDE: 112 MEQ/L
CREATININE, SERUM: 1.9 MG/DL
GLUCOSE: 105 MG/DL
MAGNESIUM: 2.5 MG/DL
POTASSIUM, SERUM: 4.3 MEQ/L
SODIUM: 144 MEQ/L

## 2017-02-22 ENCOUNTER — PRIOR ORIGINAL RECORDS (OUTPATIENT)
Dept: OTHER | Age: 77
End: 2017-02-22

## 2017-03-29 ENCOUNTER — PRIOR ORIGINAL RECORDS (OUTPATIENT)
Dept: OTHER | Age: 77
End: 2017-03-29

## 2017-03-30 ENCOUNTER — PRIOR ORIGINAL RECORDS (OUTPATIENT)
Dept: OTHER | Age: 77
End: 2017-03-30

## 2017-04-06 ENCOUNTER — TELEPHONE (OUTPATIENT)
Dept: FAMILY MEDICINE CLINIC | Facility: CLINIC | Age: 77
End: 2017-04-06

## 2017-04-19 ENCOUNTER — PRIOR ORIGINAL RECORDS (OUTPATIENT)
Dept: OTHER | Age: 77
End: 2017-04-19

## 2017-04-20 ENCOUNTER — PRIOR ORIGINAL RECORDS (OUTPATIENT)
Dept: OTHER | Age: 77
End: 2017-04-20

## 2017-04-20 PROBLEM — R61 NIGHT SWEATS: Status: ACTIVE | Noted: 2017-01-01

## 2017-04-20 NOTE — PROGRESS NOTES
Julia Antony presents with 3 months of night sweats. He wakes up with his shirt soaked through. Sometimes the underwear but not always. He denies weight loss, diarrhea, hair loss. His wife states he is anxious lately.   He did see his cardiologist Dr. Ángel Cabrera mg total) by mouth once daily. (Patient taking differently: Take 5 mg by mouth once daily. Currently taking 4 days a week ) Disp: 30 tablet Rfl: 12   Acidophilus/Pectin Oral Cap Take 1 capsule by mouth daily.  Disp:  Rfl:    spironolactone (ALDACTONE) 25 MG time.  No erythema no exudate. Mouth clear no postnasal drip no erythema no exudate. Neck without lymphadenopathy. Lungs are clear no crackles no wheezes. Lymphadenopathy surveyed throughout the remainder of the body is unremarkable.   No organomegaly i

## 2017-04-25 LAB
ALBUMIN: 3.8 G/DL
ALKALINE PHOSPHATATE(ALK PHOS): 44 IU/L
BILIRUBIN TOTAL: 0.3 MG/DL
BUN: 44 MG/DL
CALCIUM: 9.8 MG/DL
CHLORIDE: 110 MEQ/L
CREATININE, SERUM: 1.92 MG/DL
GLUCOSE: 94 MG/DL
HEMATOCRIT: 38.8 %
HEMOGLOBIN: 12.2 G/DL
MAGNESIUM: 2.6 MG/DL
PLATELETS: 155 K/UL
POTASSIUM, SERUM: 5 MEQ/L
PROTEIN, TOTAL: 6.6 G/DL
RED BLOOD COUNT: 3.94 X 10-6/U
SGOT (AST): 37 IU/L
SGPT (ALT): 43 IU/L
SODIUM: 146 MEQ/L
T4(THYROXINE): 6.4 MG/DL
THYROID STIMULATING HORMONE: 1.03 MLU/L
URIC ACID: 7.9 MG/DL
WHITE BLOOD COUNT: 6.6 X 10-3/U

## 2017-06-21 PROBLEM — N18.30 CKD (CHRONIC KIDNEY DISEASE) STAGE 3, GFR 30-59 ML/MIN (HCC): Status: ACTIVE | Noted: 2017-01-01

## 2017-06-21 PROBLEM — R61 NIGHT SWEATS: Status: RESOLVED | Noted: 2017-01-01 | Resolved: 2017-01-01

## 2017-06-21 NOTE — PROGRESS NOTES
Originally scheduled for a annual wellness visit. However when I walked in the room his wife started out with 2 episodes of diarrhea in the last 2 weeks the responded to Imodium.   Also concerned about urine with symptoms of feeling the need to void but be MG Oral Cap 1 by mouth daily Disp: 30 capsule Rfl: 0   Azelastine HCl (ASTELIN) 0.1 % Nasal Solution 1 spray by Nasal route 2 (two) times daily.  Disp: 1 Bottle Rfl: 5   BACLOFEN 10 MG Oral Tab TAKE 1 AND 1/2 TABLETS BY MOUTH EVERY DAY (Patient taking diffe Mother    • brain[other] Chandrika Scales Daughter    • cerebral palsey[other] [OTHER] Son    • Cancer Brother        PHYSICAL EXAM:  /78 mmHg  Pulse 62  Temp(Src) 97.7 °F (36.5 °C) (Oral)  Resp 16  Wt 147 lb  SpO2 94%    Alert no acute distress.   Back no CVA

## 2017-06-30 ENCOUNTER — PRIOR ORIGINAL RECORDS (OUTPATIENT)
Dept: OTHER | Age: 77
End: 2017-06-30

## 2017-06-30 NOTE — TELEPHONE ENCOUNTER
[facility]- urinary symptoms were better off of Torsemide but now they have increased his dose. He may need to see Urology per Dr. Anika Kelly. will send over Rx for Zoloft.

## 2017-06-30 NOTE — TELEPHONE ENCOUNTER
So the blood pressure is okay. The urine symptoms are better. Therefore continue the current dose of lisinopril. Do not add the Hytrin at this time. We could try adding Wellbutrin 150 mg every 12 hours. This would be for the depression symptoms.   It

## 2017-06-30 NOTE — TELEPHONE ENCOUNTER
Per Claudia Holden,  They are still waiting to hear back from Dr. Malachi Tyler. B/p today 128/85. On Lisinopril 20mg bid. Urinary symptoms better.

## 2017-06-30 NOTE — TELEPHONE ENCOUNTER
Were they able to talk to Dr. Jorge Jacobson about stopping the digoxin? How are the blood pressures running? We had lowered to the lisinopril dose. We talked about considering Hytrin if the symptoms for the urine persisted.   But this may also lower blood

## 2017-06-30 NOTE — TELEPHONE ENCOUNTER
Cannot be on Celexa and Zoloft together. Would stop Celexa by taking 10 mg a day for 3 days, then start Zoloft at 25 mg per day.

## 2017-07-01 PROBLEM — N28.9 RENAL INSUFFICIENCY: Status: ACTIVE | Noted: 2017-01-01

## 2017-07-01 NOTE — ED NOTES
Followed up with paramedics regarding pt's eye glasses. They state pt was not wearing glasses but they will double check prior to leaving the ED.

## 2017-07-01 NOTE — ED INITIAL ASSESSMENT (HPI)
Medics called to the home for STEPHEN. Pt states he was \"vibrating inside and didn't feel well, also a lot of spit in his mouth\" per medics, pt was alert and awake, answering questions appropriately.  Pt was sitting on the end of the bed and his legs were sti

## 2017-07-01 NOTE — ED PROVIDER NOTES
Patient Seen in: [facility] Emergency Department    History   Patient presents with:  Dyspnea STEPHEN SOB (respiratory)    Stated Complaint: STEPHEN    HPI    The patient is a 18-year-old male with multiple past medical problems, including hemorrhagic CVA t Citalopram Hydrobromide 10 MG Oral Tab,  Take 2.5 mg by mouth daily. Sertraline HCl (ZOLOFT) 25 MG Oral Tab,  Take 1 tablet (25 mg total) by mouth daily. finasteride 5 MG Oral Tab,  Take 1 tablet (5 mg total) by mouth once daily.    lisinopril 10 MG Ora Alcohol use: No                Review of Systems    Positive for stated complaint: STEPHEN  Other systems are as noted in HPI. Constitutional and vital signs reviewed. All other systems reviewed and negative except as noted above.     PSFH elements revi Value    BUN 40 (*)     Creatinine 2.36 (*)     GFR 25 (*)     All other components within normal limits   D-DIMER - Abnormal; Notable for the following:     D-Dimer 0.57 (*)     All other components within normal limits    Narrative:     FEU = Fibrinogen his previous EKG obtained on September 22, 2015.           ============================================================  ED Course  ------------------------------------------------------------   On arrival of the patient an EKG was obtained which showed no type    Disposition:  Discharge    Follow-up:  Thalia Thomas MD  [address] [facility]  [address] [facility] [address]    Call in 2 days        Medications Prescribed:  Current Discharge Medication List

## 2017-07-01 NOTE — H&P
[facility]OSPITAL[facility]  History and Physical     Dossie  Patient Status:  Emergency    1940 MRN [id number]   Location [facility] Attending Will, Mor Walter MD   Hosp Day # 0 PCP Anurag Conley MD     Chief Complaint: dys Statins                   Tavist                  Confusion  Warfarin Sodium         Diarrhea  Zyprexa                     Comment:TABS; confusion    Medications:    No current facility-administered medications on file prior to encounter.    Inetta Fairly Rfl:    Metoprolol Succinate ER (TOPROL XL) 100 MG Oral Tablet 24 Hr Take 100 mg by mouth 2 (two) times daily. Disp:  Rfl:        Review of Systems:   A comprehensive 14 point review of systems was completed.     Pertinent positives and negatives noted in t syndrome  3. 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  A.fib s/p pacemaker    Quality:  · DVT Prophylaxis: scds (multiple brain bleeds in past; will avoid heparin)  · CODE status: full  · Leyva: no    Plan of care discussed with patient an

## 2017-07-03 ENCOUNTER — PRIOR ORIGINAL RECORDS (OUTPATIENT)
Dept: OTHER | Age: 77
End: 2017-07-03

## 2017-07-03 NOTE — TELEPHONE ENCOUNTER
Spoke again with wife and informed her the best course of action for now would be to discuss further with Dr. Patti Schafer. He is more familiar with the patient and would have to make decision on next steps (follow up appt, staring medication, etc.).  Again enc

## 2017-07-03 NOTE — TELEPHONE ENCOUNTER
Spoke with wife, Yulia Sands (ok per HIPAA). States that patient has had 2 episodes of arm/leg shaking. Was told to report any seizure or seizure like activity to Dr. Guerrero Aguirre.      States that patient felt like he had phlegm in his throat, felt like he couldn't

## 2017-07-05 ENCOUNTER — PRIOR ORIGINAL RECORDS (OUTPATIENT)
Dept: OTHER | Age: 77
End: 2017-07-05

## 2017-07-05 NOTE — TELEPHONE ENCOUNTER
Spoke to daughter. Has episodes where his extremities are stiffening up. Does have a history of unsteady gait and as well as stroke and TIA. Will assess for seizure disorder. Was seen in the ER where symptoms were blamed on congestive heart failure.

## 2017-07-05 NOTE — TELEPHONE ENCOUNTER
Called patient back - spoke to wife; discussed symptoms - agree with EEG - will review - suspect event related to fluid overload and cardiac issues

## 2017-07-29 NOTE — PATIENT INSTRUCTIONS
Limit protein shake to one a day due to possible gout in finger. Weight may be water weight due to torsemide. Labs from July 1 reviewed. Kidney functions off slightly , likely reflecting dry due to diuretic.

## 2017-07-29 NOTE — PROGRESS NOTES
Was working in the yard earlier this week. Does not recall a bug bite but subsequently left index finger has been swollen and red. Most pain at the DIP joint. No fevers or chills. Has been soaking it with some relief. It seems a lot better today.   No Azelastine HCl (ASTELIN) 0.1 % Nasal Solution 1 spray by Nasal route 2 (two) times daily.  (Patient taking differently: 1 spray by Nasal route 2 (two) times daily as needed.  ) Disp: 1 Bottle Rfl: 5   BACLOFEN 10 MG Oral Tab TAKE 1 AND 1/2 TABLETS BY MOUT lymphadenopathy in the left axilla or left antecubital fossa. Distal pulses are normal.  Finger #2 has erythema from the PIP joint to the MCP joint. No warmth. No fluctuance. The finger is swollen in the same area. It is tender over the PIP joint.   Ra

## 2017-08-02 NOTE — PROGRESS NOTES
Here for [facility] paperwork to be filled out for his  FORD Zepeda. due to his disability with stroke. Finger is getting better, his range of motion is much improved with treatment for gout. Using torsemide 20 mg daily. In the bathroom quite a bit.   He h

## 2017-08-15 NOTE — PROCEDURES
[facility] in [place]  in affiliation with [facility]  [address]  [place], [address] [place] [address]  [phone]  Fax [phone]      Name: Kimani Cordon  12/28/1940  Date of Study: (CLEOCIN) 150 MG Oral Cap Take 150 mg by mouth. Patient takes 4 tabs prior to a procedure Disp:  Rfl:    Ascorbic Acid (VITAMIN C) 100 MG Oral Tab Take 300 mg by mouth daily.  Disp:  Rfl:    B Complex Vitamins (VITAMIN B COMPLEX) Oral Tab Take 1 tablet by m [phone]

## 2017-08-30 ENCOUNTER — PRIOR ORIGINAL RECORDS (OUTPATIENT)
Dept: OTHER | Age: 77
End: 2017-08-30

## 2017-09-14 NOTE — TELEPHONE ENCOUNTER
Per wife Daniel Murphy had teeth cleaned yesterday and had xrays done. Showed some infection under the gum. He was recommended to see Dr. Ky New. Wife did not ask for abx. She called dentist today and his dentist is not back until Monday.  There is a dentist on call

## 2017-09-14 NOTE — TELEPHONE ENCOUNTER
Patient wants to know if CZ will put him on an antibiotic for infected wisdom tooth. Dentist is closed. Doesn't want to see Oral Surgeon. Don't want to remove it. He is down to 131 lbs.

## 2017-10-04 PROBLEM — N28.9 RENAL INSUFFICIENCY: Status: RESOLVED | Noted: 2017-01-01 | Resolved: 2017-01-01

## 2017-10-04 NOTE — PATIENT INSTRUCTIONS
Steven Thomas's SCREENING SCHEDULE   Tests on this list are recommended by your physician but may not be covered, or covered at this frequency, by your insurer. Please check with your insurance carrier before scheduling to verify coverage.     PREVENTATIVE S Electrocardiogram date07/01/2017 Routine EKG is not a screening covered service except at the Welcome to Medicare Visit    Abdominal aortic aneurysm screening (once between ages 73-68)  No results found for this or any previous visit.  Limited to patients w PRESERV FREE, >=1YEARS OF AGE    Please get every year    Pneumococcal 13 (Prevnar)  Covered Once after 65   Orders placed or performed in visit on 11/25/15  -PNEUMOCOCCAL VACC, 13 TARAH IM    Please get once after your 65th birthday    Pneumococcal 23 (Pne

## 2017-10-04 NOTE — PROGRESS NOTES
HPI:   Rudolph Waite is a 64year old male who presents for a Medicare Subsequent Annual Wellness visit (Pt already had Initial Annual Wellness). Needs labs for follow-up with Dr. Kitty Childers for congestive heart failure and chronic atrial fibrillation. Outpatient Prescriptions Marked as Taking for the 10/4/17 encounter (Office Visit) with Aisha Young MD:  Gentamicin Sulfate 0.3 % Ophthalmic Solution Place 1 drop into the left eye 4 (four) times daily.  For 7 days   FINASTERIDE 5 MG Oral Tab TAKE Take 1,200 mg by mouth daily. Metoprolol Succinate ER (TOPROL XL) 100 MG Oral Tablet 24 Hr Take 100 mg by mouth 2 (two) times daily. MEDICAL INFORMATION:   He  has a past medical history of Arrhythmia; Bleeding; Congestive heart disease ([facility] [address]  Fit 10/07/2015   • Fluad High dose 65 yr and older (17738) 10/04/2017   • Influenza 10/17/2008, 10/06/2011, 10/20/2012, 10/01/2013   • Influenza Vaccine, High Dose, Preserv Free 10/12/2016   • Pneumococcal (Prevnar 13) 11/25/2015   • Pneumovax 23 10/15/2008 file in [facility] [address]? Zackery Saucedo does not have a Living Will on file in [facility] [address]. Not Discussed       Healthcare Power of  on file in Epic:    [place] does not have a Power of  for [employer] on file in [address] [place] [address] Rd.  Not Discussed           PLAN:  The buster 1-Yes    Does pain affect your day to day activities?: 1-Yes     Have you had any memory issues?: 1-Yes    Fall/Risk Scorin    Scoring Interpretation: 4+ At Risk     Depression Screening (PHQ-2/PHQ-9): Over the LAST 2 WEEKS   Little interest or pleasur chart, separate sheet to patient  PREVENTATIVE SERVICES  INDICATIONS AND SCHEDULE Internal Lab or Procedure External Lab or Procedure   Diabetes Screening      HbgA1C   Annually HEMOGLOBIN A1c (% of total Hgb)   Date Value   10/06/2011 5.6     HGBA1C (%) YEARS OF AGE    Update Immunization Activity if applicable    Pneumoccocal 13 (Prevnar)   Orders placed or performed in visit on 11/25/15  -PNEUMOCOCCAL VACC, 13 TARAH IM         Pneumococcal 23 (Pneumovax) No orders found for this or any previous visit.

## 2017-10-07 ENCOUNTER — PRIOR ORIGINAL RECORDS (OUTPATIENT)
Dept: OTHER | Age: 77
End: 2017-10-07

## 2017-10-10 ENCOUNTER — PRIOR ORIGINAL RECORDS (OUTPATIENT)
Dept: OTHER | Age: 77
End: 2017-10-10

## 2017-10-25 ENCOUNTER — PRIOR ORIGINAL RECORDS (OUTPATIENT)
Dept: OTHER | Age: 77
End: 2017-10-25

## 2017-11-09 ENCOUNTER — PRIOR ORIGINAL RECORDS (OUTPATIENT)
Dept: OTHER | Age: 77
End: 2017-11-09

## 2017-11-09 LAB
ALBUMIN: 3.7 G/DL
ALKALINE PHOSPHATATE(ALK PHOS): 33 IU/L
BILIRUBIN TOTAL: 0.4 MG/DL
BNP: 165 PMOL/L
BUN: 54 MG/DL
BUN: 62 MG/DL
CALCIUM: 10.2 MG/DL
CALCIUM: 9.8 MG/DL
CHLORIDE: 109 MEQ/L
CHLORIDE: 110 MEQ/L
CHOLESTEROL, TOTAL: 127 MG/DL
CREATININE, SERUM: 2.1 MG/DL
CREATININE, SERUM: 2.32 MG/DL
GLUCOSE: 83 MG/DL
GLUCOSE: 84 MG/DL
HDL CHOLESTEROL: 35 MG/DL
HEMATOCRIT: 35.2 %
HEMOGLOBIN: 11 G/DL
IRON, TOTAL: 64 MCG/DL
LDL CHOLESTEROL: 78 MG/DL
PLATELETS: 164 K/UL
POTASSIUM, SERUM: 4.2 MEQ/L
POTASSIUM, SERUM: 4.5 MEQ/L
PROTEIN, TOTAL: 6.5 G/DL
RED BLOOD COUNT: 3.46 X 10-6/U
SGOT (AST): 20 IU/L
SGPT (ALT): 28 IU/L
SODIUM: 145 MEQ/L
SODIUM: 145 MEQ/L
THYROID STIMULATING HORMONE: 1.32 MLU/L
TRIGLYCERIDES: 69 MG/DL
WHITE BLOOD COUNT: 5.5 X 10-3/U

## 2017-11-10 ENCOUNTER — PRIOR ORIGINAL RECORDS (OUTPATIENT)
Dept: OTHER | Age: 77
End: 2017-11-10

## 2017-11-14 ENCOUNTER — PRIOR ORIGINAL RECORDS (OUTPATIENT)
Dept: OTHER | Age: 77
End: 2017-11-14

## 2017-11-17 ENCOUNTER — PRIOR ORIGINAL RECORDS (OUTPATIENT)
Dept: OTHER | Age: 77
End: 2017-11-17

## 2017-11-20 ENCOUNTER — PRIOR ORIGINAL RECORDS (OUTPATIENT)
Dept: OTHER | Age: 77
End: 2017-11-20

## 2017-11-29 ENCOUNTER — PRIOR ORIGINAL RECORDS (OUTPATIENT)
Dept: OTHER | Age: 77
End: 2017-11-29

## 2017-11-30 LAB
BUN: 52 MG/DL
CHLORIDE: 110 MEQ/L
CREATININE, SERUM: 2.05 MG/DL
GLUCOSE: 90 MG/DL
POTASSIUM, SERUM: 5.1 MEQ/L
SODIUM: 147 MEQ/L

## 2017-12-01 ENCOUNTER — PRIOR ORIGINAL RECORDS (OUTPATIENT)
Dept: OTHER | Age: 77
End: 2017-12-01

## 2017-12-02 PROBLEM — I87.2 VENOUS INSUFFICIENCY OF BOTH LOWER EXTREMITIES: Status: ACTIVE | Noted: 2017-01-01

## 2017-12-02 NOTE — PROGRESS NOTES
Here with swelling of both legs. Has been in contact with Dr. Mariah France and her nurse practitioner. Dr. Mariah France had taken him off of the diuretic, torsemide, because of his renal function.   In reviewing his BUN and creatinine, creatinine is been between MOUTH EVERY DAY Disp: 90 tablet Rfl: 0   BACLOFEN 10 MG Oral Tab TAKE ONE AND ONE-HALF TABLETS BY MOUTH ONCE DAILY Disp: 135 tablet Rfl: 0   lisinopril 20 MG Oral Tab Take 1 tablet (20 mg total) by mouth 2 (two) times daily. (Patient taking differently:  Ta °C) (Temporal)   Resp 16   Ht 66\"   Wt 147 lb   BMI 23.73 kg/m²     Alert no acute distress. Breathing comfortably. Lungs are clear no crackles nor wheezes. Heart regular rate and rhythm. Abdomen no bruit.   Extremities 2+ pulses 1+ edema from the feet

## 2017-12-14 NOTE — PATIENT INSTRUCTIONS
Refill policies:    • Allow 2-3 business days for refills; controlled substances may take longer.   • Contact your pharmacy at least 5 days prior to running out of medication and have them send an electronic request or submit request through the [facility] have a procedure or additional testing performed. [facility]) will contact your insurance carrier to obtain pre-certification or prior authorization.     Unfortunately, RYAN has seen an increase in denial of payment even though the p

## 2017-12-14 NOTE — PROGRESS NOTES
[employer] Progress Note    HPI  Patient presents with:  TIA: Follow up on meds    In summary of prior notes and history: \"  Megan Blake is a 64year old man with extensive past medical history as noted below (including Afib with ICD, H Past Medical History:   Diagnosis Date   • Arrhythmia     A fib   • Bleeding     Brain   • Congestive heart disease ([facility] [address]    • Fitting and adjustment of cardiac pacemaker 1/1/05   • History of blood transfusion    • Other and unspecified hyperlipide SERTRALINE HCL 25 MG Oral Tab, TAKE 1 TABLET(25 MG) BY MOUTH DAILY, Disp: 30 tablet, Rfl: 0  •  FINASTERIDE 5 MG Oral Tab, TAKE 1 TABLET(5 MG) BY MOUTH EVERY DAY, Disp: 90 tablet, Rfl: 0  •  hydrALAzine HCl 10 MG Oral Tab, Take 1 tablet by mouth 3 (three) otherwise as noted in HPI. Exam:  /60   Pulse 80   Resp 16   Ht 66\"   Wt 147 lb   BMI 23.73 kg/m²   Estimated body mass index is 23.73 kg/m² as calculated from the following:    Height as of this encounter: 66\".     Weight as of this encounter: 1 Diffusely prominent ventricles and sulci are again seen, stable. No new mass effect or midline shift. There is extensive low attenuation consistent with chronic small vessel disease. This appears similar to  the prior including small lacunar type infarcts. strokes - left temporal in 1994 and pontine Oct 2009), who presented in follow up after admission 9/23 for transient right sided weakness, which has since resolved - unable to have MRI but likely had TIA    Exam continues to demonstrate chronic deficits fr

## 2017-12-28 NOTE — PROGRESS NOTES
l Theonine  Supplement    Here with leg swelling. Treated for congestive heart failure with Dr. Katie Benson from cardiology. He was instructed to stop torsemide because of his elevated creatinine levels. His wife admits to giving him about 5 mg per day. route 2 (two) times daily. Disp: 1 Bottle Rfl: 5   colchicine 0.6 MG Oral Tab Take 1 tablet (0.6 mg total) by mouth daily. Repeat in 6 hours. Only two per day. Disp: 10 tablet Rfl: 0   Acidophilus/Pectin Oral Cap Take 1 capsule by mouth daily.  Disp:  Rfl: tender. Assessment #1 hypertension labile #2 edema bilateral #3 congestive heart failure by history#4 depressed mood improved with Zoloft but possible side effect of edema     plans discontinue Zoloft.   Okay to try the supplement his wife asked about ab

## 2018-01-01 ENCOUNTER — HOSPITAL ENCOUNTER (OUTPATIENT)
Dept: LAB | Facility: HOSPITAL | Age: 78
Discharge: HOME | End: 2018-01-01
Attending: FAMILY MEDICINE
Payer: MEDICARE

## 2018-01-01 ENCOUNTER — ANESTHESIA EVENT (OUTPATIENT)
Dept: ENDOSCOPY | Facility: HOSPITAL | Age: 78
End: 2018-01-01

## 2018-01-01 ENCOUNTER — SURGERY (OUTPATIENT)
Age: 78
End: 2018-01-01

## 2018-01-01 ENCOUNTER — TELEPHONE (OUTPATIENT)
Dept: FAMILY MEDICINE CLINIC | Facility: CLINIC | Age: 78
End: 2018-01-01

## 2018-01-01 ENCOUNTER — APPOINTMENT (OUTPATIENT)
Dept: GENERAL RADIOLOGY | Facility: HOSPITAL | Age: 78
DRG: 388 | End: 2018-01-01
Attending: COLON & RECTAL SURGERY
Payer: MEDICARE

## 2018-01-01 ENCOUNTER — HOSPITAL ENCOUNTER (INPATIENT)
Facility: HOSPITAL | Age: 78
LOS: 4 days | Discharge: HOSPICE | DRG: 388 | End: 2018-01-01
Attending: INTERNAL MEDICINE | Admitting: INTERNAL MEDICINE
Payer: MEDICARE

## 2018-01-01 ENCOUNTER — HOSPITAL ENCOUNTER (OUTPATIENT)
Dept: CT IMAGING | Facility: HOSPITAL | Age: 78
Discharge: HOME | DRG: 388 | End: 2018-01-01
Attending: FAMILY MEDICINE
Payer: MEDICARE

## 2018-01-01 ENCOUNTER — TELEPHONE (OUTPATIENT)
Dept: SURGERY | Facility: CLINIC | Age: 78
End: 2018-01-01

## 2018-01-01 ENCOUNTER — APPOINTMENT (OUTPATIENT)
Dept: CT IMAGING | Facility: HOSPITAL | Age: 78
DRG: 388 | End: 2018-01-01
Attending: Other
Payer: MEDICARE

## 2018-01-01 ENCOUNTER — APPOINTMENT (OUTPATIENT)
Dept: GENERAL RADIOLOGY | Facility: HOSPITAL | Age: 78
DRG: 388 | End: 2018-01-01
Attending: HOSPITALIST
Payer: OTHER MISCELLANEOUS

## 2018-01-01 ENCOUNTER — LAB ENCOUNTER (OUTPATIENT)
Dept: LAB | Age: 78
DRG: 388 | End: 2018-01-01
Attending: FAMILY MEDICINE
Payer: MEDICARE

## 2018-01-01 ENCOUNTER — ANESTHESIA (OUTPATIENT)
Dept: ENDOSCOPY | Facility: HOSPITAL | Age: 78
End: 2018-01-01

## 2018-01-01 ENCOUNTER — APPOINTMENT (OUTPATIENT)
Dept: GENERAL RADIOLOGY | Facility: HOSPITAL | Age: 78
DRG: 388 | End: 2018-01-01
Attending: INTERNAL MEDICINE
Payer: MEDICARE

## 2018-01-01 ENCOUNTER — OFFICE VISIT (OUTPATIENT)
Dept: FAMILY MEDICINE CLINIC | Facility: CLINIC | Age: 78
End: 2018-01-01

## 2018-01-01 ENCOUNTER — HOSPITAL ENCOUNTER (INPATIENT)
Facility: HOSPITAL | Age: 78
LOS: 3 days | DRG: 388 | End: 2018-01-01
Attending: HOSPITALIST | Admitting: HOSPITALIST
Payer: OTHER MISCELLANEOUS

## 2018-01-01 VITALS
HEART RATE: 105 BPM | RESPIRATION RATE: 20 BRPM | TEMPERATURE: 98 F | OXYGEN SATURATION: 95 % | SYSTOLIC BLOOD PRESSURE: 133 MMHG | DIASTOLIC BLOOD PRESSURE: 92 MMHG

## 2018-01-01 VITALS
HEIGHT: 66 IN | TEMPERATURE: 98 F | DIASTOLIC BLOOD PRESSURE: 78 MMHG | OXYGEN SATURATION: 98 % | SYSTOLIC BLOOD PRESSURE: 108 MMHG | RESPIRATION RATE: 18 BRPM | HEART RATE: 66 BPM | BODY MASS INDEX: 22.82 KG/M2 | WEIGHT: 142 LBS

## 2018-01-01 VITALS
WEIGHT: 135 LBS | SYSTOLIC BLOOD PRESSURE: 102 MMHG | RESPIRATION RATE: 18 BRPM | DIASTOLIC BLOOD PRESSURE: 76 MMHG | TEMPERATURE: 99 F | HEART RATE: 78 BPM | OXYGEN SATURATION: 98 % | HEIGHT: 66 IN | BODY MASS INDEX: 21.69 KG/M2

## 2018-01-01 VITALS
HEART RATE: 68 BPM | RESPIRATION RATE: 6 BRPM | OXYGEN SATURATION: 97 % | TEMPERATURE: 102 F | DIASTOLIC BLOOD PRESSURE: 41 MMHG | SYSTOLIC BLOOD PRESSURE: 84 MMHG

## 2018-01-01 DIAGNOSIS — F33.1 MODERATE EPISODE OF RECURRENT MAJOR DEPRESSIVE DISORDER (HCC): ICD-10-CM

## 2018-01-01 DIAGNOSIS — R10.30 LOWER ABDOMINAL PAIN: ICD-10-CM

## 2018-01-01 DIAGNOSIS — I50.22 CHRONIC SYSTOLIC CONGESTIVE HEART FAILURE (HCC): ICD-10-CM

## 2018-01-01 DIAGNOSIS — I48.20 CHRONIC ATRIAL FIBRILLATION (HCC): ICD-10-CM

## 2018-01-01 DIAGNOSIS — I48.20 CHRONIC ATRIAL FIBRILLATION (HCC): Primary | ICD-10-CM

## 2018-01-01 DIAGNOSIS — G45.9 TRANSIENT CEREBRAL ISCHEMIA, UNSPECIFIED TYPE: ICD-10-CM

## 2018-01-01 DIAGNOSIS — N40.0 BENIGN NODULAR PROSTATIC HYPERPLASIA WITHOUT LOWER URINARY TRACT SYMPTOMS: ICD-10-CM

## 2018-01-01 DIAGNOSIS — K62.5 RECTAL BLEED: ICD-10-CM

## 2018-01-01 DIAGNOSIS — K11.7 HYPERSALIVATION: Primary | ICD-10-CM

## 2018-01-01 DIAGNOSIS — K56.609 INTESTINAL OBSTRUCTION, UNSPECIFIED CAUSE, UNSPECIFIED WHETHER PARTIAL OR COMPLETE (HCC): ICD-10-CM

## 2018-01-01 DIAGNOSIS — R10.30 LOWER ABDOMINAL PAIN: Primary | ICD-10-CM

## 2018-01-01 DIAGNOSIS — K56.609 INTESTINAL OBSTRUCTION, UNSPECIFIED CAUSE, UNSPECIFIED WHETHER PARTIAL OR COMPLETE (HCC): Primary | ICD-10-CM

## 2018-01-01 LAB
BUN BLD-MCNC: 59 MG/DL (ref 8–20)
CALCIUM BLD-MCNC: 9.6 MG/DL (ref 8.3–10.3)
CHLORIDE: 112 MMOL/L (ref 101–111)
CO2: 28 MMOL/L (ref 22–32)
CREAT BLD-MCNC: 1.97 MG/DL (ref 0.7–1.3)
GLUCOSE BLD-MCNC: 93 MG/DL (ref 70–99)
POTASSIUM SERPL-SCNC: 4.9 MMOL/L (ref 3.6–5.1)
SODIUM SERPL-SCNC: 145 MMOL/L (ref 136–144)

## 2018-01-01 PROCEDURE — 70450 CT HEAD/BRAIN W/O DYE: CPT | Performed by: OTHER

## 2018-01-01 PROCEDURE — 80048 BASIC METABOLIC PNL TOTAL CA: CPT | Performed by: FAMILY MEDICINE

## 2018-01-01 PROCEDURE — 36415 COLL VENOUS BLD VENIPUNCTURE: CPT | Performed by: FAMILY MEDICINE

## 2018-01-01 PROCEDURE — 99233 SBSQ HOSP IP/OBS HIGH 50: CPT | Performed by: COLON & RECTAL SURGERY

## 2018-01-01 PROCEDURE — 99232 SBSQ HOSP IP/OBS MODERATE 35: CPT | Performed by: HOSPITALIST

## 2018-01-01 PROCEDURE — 05H533Z INSERTION OF INFUSION DEVICE INTO RIGHT SUBCLAVIAN VEIN, PERCUTANEOUS APPROACH: ICD-10-PCS | Performed by: HOSPITALIST

## 2018-01-01 PROCEDURE — 99214 OFFICE O/P EST MOD 30 MIN: CPT | Performed by: FAMILY MEDICINE

## 2018-01-01 PROCEDURE — 99223 1ST HOSP IP/OBS HIGH 75: CPT | Performed by: OTHER

## 2018-01-01 PROCEDURE — 99497 ADVNCD CARE PLAN 30 MIN: CPT | Performed by: NURSE PRACTITIONER

## 2018-01-01 PROCEDURE — 85025 COMPLETE CBC W/AUTO DIFF WBC: CPT

## 2018-01-01 PROCEDURE — 99213 OFFICE O/P EST LOW 20 MIN: CPT | Performed by: FAMILY MEDICINE

## 2018-01-01 PROCEDURE — 0DBN8ZX EXCISION OF SIGMOID COLON, VIA NATURAL OR ARTIFICIAL OPENING ENDOSCOPIC, DIAGNOSTIC: ICD-10-PCS | Performed by: INTERNAL MEDICINE

## 2018-01-01 PROCEDURE — 99223 1ST HOSP IP/OBS HIGH 75: CPT | Performed by: INTERNAL MEDICINE

## 2018-01-01 PROCEDURE — 74019 RADEX ABDOMEN 2 VIEWS: CPT | Performed by: COLON & RECTAL SURGERY

## 2018-01-01 PROCEDURE — 83690 ASSAY OF LIPASE: CPT

## 2018-01-01 PROCEDURE — 99233 SBSQ HOSP IP/OBS HIGH 50: CPT | Performed by: HOSPITALIST

## 2018-01-01 PROCEDURE — 74283 THER NMA RDCTJ INTUS/OBSTRCJ: CPT | Performed by: COLON & RECTAL SURGERY

## 2018-01-01 PROCEDURE — 71045 X-RAY EXAM CHEST 1 VIEW: CPT | Performed by: HOSPITALIST

## 2018-01-01 PROCEDURE — 74270 X-RAY XM COLON 1CNTRST STD: CPT | Performed by: COLON & RECTAL SURGERY

## 2018-01-01 PROCEDURE — 74176 CT ABD & PELVIS W/O CONTRAST: CPT | Performed by: FAMILY MEDICINE

## 2018-01-01 PROCEDURE — 99222 1ST HOSP IP/OBS MODERATE 55: CPT | Performed by: NURSE PRACTITIONER

## 2018-01-01 PROCEDURE — 99223 1ST HOSP IP/OBS HIGH 75: CPT | Performed by: COLON & RECTAL SURGERY

## 2018-01-01 PROCEDURE — 99239 HOSP IP/OBS DSCHRG MGMT >30: CPT | Performed by: HOSPITALIST

## 2018-01-01 PROCEDURE — 74021 RADEX ABDOMEN 3+ VIEWS: CPT | Performed by: INTERNAL MEDICINE

## 2018-01-01 PROCEDURE — 81003 URINALYSIS AUTO W/O SCOPE: CPT | Performed by: FAMILY MEDICINE

## 2018-01-01 PROCEDURE — 99233 SBSQ HOSP IP/OBS HIGH 50: CPT | Performed by: NURSE PRACTITIONER

## 2018-01-01 PROCEDURE — 99232 SBSQ HOSP IP/OBS MODERATE 35: CPT | Performed by: OTHER

## 2018-01-01 PROCEDURE — 95816 EEG AWAKE AND DROWSY: CPT | Performed by: OTHER

## 2018-01-01 PROCEDURE — 36415 COLL VENOUS BLD VENIPUNCTURE: CPT

## 2018-01-01 PROCEDURE — 80053 COMPREHEN METABOLIC PANEL: CPT

## 2018-01-01 RX ORDER — NALOXONE HYDROCHLORIDE 0.4 MG/ML
80 INJECTION, SOLUTION INTRAMUSCULAR; INTRAVENOUS; SUBCUTANEOUS AS NEEDED
Status: DISCONTINUED | OUTPATIENT
Start: 2018-01-01 | End: 2018-01-01 | Stop reason: HOSPADM

## 2018-01-01 RX ORDER — SCOLOPAMINE TRANSDERMAL SYSTEM 1 MG/1
1 PATCH, EXTENDED RELEASE TRANSDERMAL
Status: DISCONTINUED | OUTPATIENT
Start: 2018-01-01 | End: 2018-01-01

## 2018-01-01 RX ORDER — MORPHINE SULFATE 4 MG/ML
2 INJECTION, SOLUTION INTRAMUSCULAR; INTRAVENOUS
Status: DISCONTINUED | OUTPATIENT
Start: 2018-01-01 | End: 2018-01-01

## 2018-01-01 RX ORDER — FUROSEMIDE 10 MG/ML
40 INJECTION INTRAMUSCULAR; INTRAVENOUS EVERY 8 HOURS PRN
Status: DISCONTINUED | OUTPATIENT
Start: 2018-01-01 | End: 2018-01-01

## 2018-01-01 RX ORDER — MORPHINE SULFATE 4 MG/ML
2 INJECTION, SOLUTION INTRAMUSCULAR; INTRAVENOUS EVERY 2 HOUR PRN
Status: DISCONTINUED | OUTPATIENT
Start: 2018-01-01 | End: 2018-01-01

## 2018-01-01 RX ORDER — SODIUM CHLORIDE 0.9 % (FLUSH) 0.9 %
10 SYRINGE (ML) INJECTION AS NEEDED
Status: DISCONTINUED | OUTPATIENT
Start: 2018-01-01 | End: 2018-01-01

## 2018-01-01 RX ORDER — MINERAL OIL 100 G/100G
1 OIL RECTAL ONCE
Status: COMPLETED | OUTPATIENT
Start: 2018-01-01 | End: 2018-01-01

## 2018-01-01 RX ORDER — SODIUM CHLORIDE 9 MG/ML
INJECTION, SOLUTION INTRAVENOUS CONTINUOUS
Status: DISCONTINUED | OUTPATIENT
Start: 2018-01-01 | End: 2018-01-01

## 2018-01-01 RX ORDER — SODIUM CHLORIDE, SODIUM LACTATE, POTASSIUM CHLORIDE, CALCIUM CHLORIDE 600; 310; 30; 20 MG/100ML; MG/100ML; MG/100ML; MG/100ML
INJECTION, SOLUTION INTRAVENOUS CONTINUOUS
Status: DISCONTINUED | OUTPATIENT
Start: 2018-01-01 | End: 2018-01-01

## 2018-01-01 RX ORDER — METOPROLOL TARTRATE 5 MG/5ML
5 INJECTION INTRAVENOUS EVERY 6 HOURS
Status: DISCONTINUED | OUTPATIENT
Start: 2018-01-01 | End: 2018-01-01

## 2018-01-01 RX ORDER — HALOPERIDOL 5 MG/ML
2 INJECTION INTRAMUSCULAR EVERY 2 HOUR PRN
Status: DISCONTINUED | OUTPATIENT
Start: 2018-01-01 | End: 2018-01-01

## 2018-01-01 RX ORDER — ONDANSETRON 2 MG/ML
4 INJECTION INTRAMUSCULAR; INTRAVENOUS EVERY 6 HOURS PRN
Status: CANCELLED | OUTPATIENT
Start: 2018-01-01

## 2018-01-01 RX ORDER — LORAZEPAM 2 MG/ML
1 INJECTION INTRAMUSCULAR EVERY 4 HOURS PRN
Status: DISCONTINUED | OUTPATIENT
Start: 2018-01-01 | End: 2018-01-01

## 2018-01-01 RX ORDER — BACLOFEN 10 MG/1
TABLET ORAL
Qty: 135 TABLET | Refills: 0 | Status: ON HOLD | OUTPATIENT
Start: 2018-01-01 | End: 2018-01-01

## 2018-01-01 RX ORDER — MORPHINE SULFATE 4 MG/ML
2 INJECTION, SOLUTION INTRAMUSCULAR; INTRAVENOUS
Status: CANCELLED | OUTPATIENT
Start: 2018-01-01

## 2018-01-01 RX ORDER — ATROPINE SULFATE 10 MG/ML
2 SOLUTION/ DROPS OPHTHALMIC EVERY 2 HOUR PRN
Status: DISCONTINUED | OUTPATIENT
Start: 2018-01-01 | End: 2018-01-01

## 2018-01-01 RX ORDER — HEPARIN SODIUM 5000 [USP'U]/ML
5000 INJECTION, SOLUTION INTRAVENOUS; SUBCUTANEOUS EVERY 12 HOURS SCHEDULED
Status: DISCONTINUED | OUTPATIENT
Start: 2018-01-01 | End: 2018-01-01

## 2018-01-01 RX ORDER — HALOPERIDOL 5 MG/ML
2 INJECTION INTRAMUSCULAR EVERY 2 HOUR PRN
Status: CANCELLED | OUTPATIENT
Start: 2018-01-01

## 2018-01-01 RX ORDER — LORAZEPAM 2 MG/ML
1 INJECTION INTRAMUSCULAR EVERY 4 HOURS PRN
Status: CANCELLED | OUTPATIENT
Start: 2018-01-01

## 2018-01-01 RX ORDER — MORPHINE SULFATE 4 MG/ML
4 INJECTION, SOLUTION INTRAMUSCULAR; INTRAVENOUS EVERY 2 HOUR PRN
Status: DISCONTINUED | OUTPATIENT
Start: 2018-01-01 | End: 2018-01-01

## 2018-01-01 RX ORDER — MORPHINE SULFATE 20 MG/ML
10 SOLUTION ORAL
Status: DISCONTINUED | OUTPATIENT
Start: 2018-01-01 | End: 2018-01-01

## 2018-01-01 RX ORDER — MINERAL OIL 100 G/100G
1 OIL RECTAL ONCE AS NEEDED
Status: DISCONTINUED | OUTPATIENT
Start: 2018-01-01 | End: 2018-01-01

## 2018-01-01 RX ORDER — ONDANSETRON 2 MG/ML
4 INJECTION INTRAMUSCULAR; INTRAVENOUS EVERY 6 HOURS PRN
Status: DISCONTINUED | OUTPATIENT
Start: 2018-01-01 | End: 2018-01-01

## 2018-01-01 RX ORDER — MORPHINE SULFATE 4 MG/ML
1 INJECTION, SOLUTION INTRAMUSCULAR; INTRAVENOUS EVERY 2 HOUR PRN
Status: DISCONTINUED | OUTPATIENT
Start: 2018-01-01 | End: 2018-01-01

## 2018-01-01 RX ORDER — FINASTERIDE 5 MG/1
TABLET, FILM COATED ORAL
Qty: 90 TABLET | Refills: 0 | Status: ON HOLD | OUTPATIENT
Start: 2018-01-01 | End: 2018-01-01

## 2018-01-01 RX ORDER — ACETAMINOPHEN 650 MG/1
650 SUPPOSITORY RECTAL EVERY 4 HOURS PRN
Status: DISCONTINUED | OUTPATIENT
Start: 2018-01-01 | End: 2018-01-01

## 2018-01-01 RX ORDER — SCOLOPAMINE TRANSDERMAL SYSTEM 1 MG/1
1 PATCH, EXTENDED RELEASE TRANSDERMAL
Status: CANCELLED | OUTPATIENT
Start: 2018-01-01

## 2018-01-01 RX ORDER — HALOPERIDOL 5 MG/ML
2 INJECTION INTRAMUSCULAR EVERY 6 HOURS PRN
Status: DISCONTINUED | OUTPATIENT
Start: 2018-01-01 | End: 2018-01-01

## 2018-01-01 RX ORDER — METOPROLOL TARTRATE 5 MG/5ML
5 INJECTION INTRAVENOUS EVERY 6 HOURS
Status: CANCELLED | OUTPATIENT
Start: 2018-01-01

## 2018-01-01 RX ORDER — DEXTROSE, SODIUM CHLORIDE, AND POTASSIUM CHLORIDE 5; .45; .075 G/100ML; G/100ML; G/100ML
INJECTION INTRAVENOUS CONTINUOUS
Status: DISCONTINUED | OUTPATIENT
Start: 2018-01-01 | End: 2018-01-01

## 2018-01-01 RX ORDER — GLYCOPYRROLATE 0.2 MG/ML
0.4 INJECTION, SOLUTION INTRAMUSCULAR; INTRAVENOUS
Status: DISCONTINUED | OUTPATIENT
Start: 2018-01-01 | End: 2018-01-01

## 2018-01-02 NOTE — TELEPHONE ENCOUNTER
From: Aislinn Alvarez  To: Aury Terrazas MD  Sent: 12/30/2017 7:42 AM CST  Subject: Test Results Question    Saw the results from test. Are orders in for repeat blood work ? Will redo end of January?  Thanks Nba Urbina

## 2018-01-26 ENCOUNTER — PRIOR ORIGINAL RECORDS (OUTPATIENT)
Dept: OTHER | Age: 78
End: 2018-01-26

## 2018-02-17 NOTE — PROGRESS NOTES
For about 2 weeks is having complaints of something in his mouth. Denies pain. No burning. He has had a cold but this started before the cold symptoms. Today put a tissue in his nostrils and on the right side thought there was some blood.   He denies an tablet Rfl: 0   hydrALAzine HCl 10 MG Oral Tab Take 1 tablet by mouth as needed.    Disp:  Rfl:    triamcinolone acetonide 0.1 % External Ointment Apply to leg twice daily Disp: 80 g Rfl: 1   Azelastine HCl (ASTELIN) 0.1 % Nasal Solution 1 spray by Nasal ro SpO2 98%   BMI 22.92 kg/m²     Alert no acute distress. Mouth moist.  Some gingivitis towards the upper jaw to the far right. No erythema. No adherent plaques on the tongue. Remainder of the gums look normal.  Neck without lymphadenopathy.     Assessmen

## 2018-03-14 ENCOUNTER — PRIOR ORIGINAL RECORDS (OUTPATIENT)
Dept: OTHER | Age: 78
End: 2018-03-14

## 2018-04-02 PROBLEM — K56.609 SBO (SMALL BOWEL OBSTRUCTION) (HCC): Status: ACTIVE | Noted: 2018-01-01

## 2018-04-02 NOTE — TELEPHONE ENCOUNTER
Patient had some vomiting on Saturday. Has now subsided. Having some left sided abdominal pain after eating. Pain is 3/10. Patient to keep appt at office. Wife instructed ER if pain gets worse. No fever.

## 2018-04-02 NOTE — TELEPHONE ENCOUNTER
Pt's wife called stated pt has a left side abdominal pain, pt vomited yesterday and the pain comes and goes, pt had a little bit to eat and after eating it triggered the pain.  Pt's wife did make an appt today to see bernabe Sarkar at 1:30pm. Please call and adv

## 2018-04-02 NOTE — PROGRESS NOTES
HPI:    Patient ID: Penelope Wolf is a 76year old male. Abdominal Pain   This is a new problem. Episode onset: Saturday. The onset quality is gradual. The problem occurs constantly. The problem has been unchanged. The pain is located in the RLQ and LLQ.  Annika Reyes to a procedure Disp:  Rfl:    Ascorbic Acid (VITAMIN C) 100 MG Oral Tab Take 300 mg by mouth daily. Disp:  Rfl:    B Complex Vitamins (VITAMIN B COMPLEX) Oral Tab Take 1 tablet by mouth daily.  Disp:  Rfl:    aspirin 81 MG Oral Tab EC Take 81 mg by mouth 2 abdominal pain  (primary encounter diagnosis)    Concern for diverticulitis    1. Lower abdominal pain  - URINALYSIS, AUTO, W/O SCOPE  - COMP METABOLIC PANEL (14); Future  - CBC WITH DIFFERENTIAL WITH PLATELET; Future  - LIPASE;  Future  - CT ABDOMEN+PELVIS

## 2018-04-03 NOTE — PROGRESS NOTES
Spoke with Dr. Wayne Rowe regarding patient update. Orders received for patient to have Xray of colon with gastrograffin. Spoke with Radiology, patient will be transferred from Endo to Xray. Will await return.

## 2018-04-03 NOTE — CONSULTS
[facility]  Report of Consultation    Fercho Hess Patient Status:  Inpatient    1940 MRN [id number]   [facility] 3NW-A Attending Floresita German MD   Hosp Day # 1 PCP Hector De Dios MD     Requesting Physician:  Prema Gonzalez MD blood transfusion    • Other and unspecified hyperlipidemia    • Renal disorder    • Stroke [facility]) 2005    tia, right arm weakness, hx brain bleed x 2   • Unspecified essential hypertension    • Unspecified sleep apnea      Past Surgical History:  No date: Tab Take 300 mg by mouth daily. B Complex Vitamins (VITAMIN B COMPLEX) Oral Tab Take 1 tablet by mouth daily. aspirin 81 MG Oral Tab EC Take 81 mg by mouth 2 (two) times daily.      Calcium Carbonate-Vitamin D (CALTRATE 600+D) 600-400 MG-UNIT Oral Tab T No fatigue, No weight loss, No anorexia  Eyes: +burry vision, No icterus  ENT: No tinnitus, No rhinorrhea, No dysphagia, No odynophagia  Respiratory: No dyspnea, No wheezing  Cardiovascular: No chest pain, No palpitations, No leg swelling  Gastrointestinal CL  107   CO2  26.0   ALKPHO  43*   AST  18   ALT  21   BILT  0.8   TP  7.1         No results for input(s): PTP, INR, PTT in the last 72 hours.       Xr Colon, Single Contrast (cpt=74270)    Result Date: 4/2/2018  CONCLUSION:   Moderate stenosis in the m attack)     Stroke due to intracerebral hemorrhage (HCC)     Recurrent major depressive disorder (HCC)     Benign prostatic hyperplasia     Hyperglycemia     At risk for falling     Unsteady gait     Congestive heart failure (HCC)     CKD (chronic kidney d

## 2018-04-03 NOTE — CONSULTS
[facility]                       Gastroenterology [address] [facility] Gastroenterology    Climmie Glassing Patient Status:  Inpatient    1940 MRN [id number]   [facility] 3NW-A Attending Devin Levy MD   [facility] Day # 0 Bilateral  1/1/02: OTHER      Comment: mitral valve repair  No date: OTHER      Comment: Eye muscle surgery  2002, 2004: OTHER SURGICAL HISTORY      Comment: MVR, repair valve first  Medications:   0.9%  NaCl infusion  Intravenous Continuous   Heparin Sodi recent rashes or chronic skin disorders            Rheumatologic: The patient reports no history of chronic arthritis, myalgias, arthralgias            Genitourinary:  The patient reports no history of recurrent urinary tract infections, hematuria, dysuria Radiology) NRDR ([address]) which includes the Dose Index Registry. PATIENT STATED HISTORY: (As transcribed by Technologist)  Patient with left lower and right lower quadrant pain x 3 days, nausea and vomiting.           FINDING appropriate for patient age. BONES:  No bony lesion or fracture. LUNG BASES:  Lung bases demonstrate moderate basilar atelectasis versus scarring.   OTHER:  Negative.       =====  CONCLUSION:  Abnormal appearance of which is markedly dilated at the le family at the bedside. They express understanding and and are agreeable to proceed. Thank you for the consultation, we will follow the patient with you.     Parker Barnes MD  9:02 PM  4/2/2018  [facility] Gastroenterology  [phone]

## 2018-04-03 NOTE — PROGRESS NOTES
[facility]  Progress Note    Tabby Martinez Patient Status:  Inpatient    1940 MRN [id number]   [facility] 3NW-A Attending Grazyna Tejeda MD   Hosp Day # 1 PCP Shanae Chau MD     Subjective:  Pt just returned from x-ray - he curr ischemic attack)     Stroke due to intracerebral hemorrhage (HCC)     Recurrent major depressive disorder (HCC)     Benign prostatic hyperplasia     Hyperglycemia     At risk for falling     Unsteady gait     Congestive heart failure (HCC)     CKD (chronic able to pass a small amount of flatus and a small amount of stool this evening. He switches from preferring to avoid surgery and just die back to wishing to proceed with surgery. His family is questioning whether he would be able to handle a colostomy bag.

## 2018-04-03 NOTE — SLP NOTE
Attempted to see pt for speech therapy services. Per general surgery, pt to remain strict NPO at this time. Will follow up as scheduling permits. Thank you.     Suzie Flowers M.S. [address] [facility]  Pager [phone]

## 2018-04-03 NOTE — ANESTHESIA POSTPROCEDURE EVALUATION
Isa 374 Patient Status:  Inpatient   Age/Gender 76year old male MRN [id number]   Location [address] [facility] Attending Kim Cadet MD   [facility] Day # 0 PCP Shila Karimi MD       Anesthesia Post-op Note    Procedure(s):  colon

## 2018-04-03 NOTE — PROGRESS NOTES
[facility]  Progress Note     Clista Simple Patient Status:  Inpatient    1940 MRN [id number]   [facility] 3NW-A Attending Wendy Solano MD   Hosp Day # 1 PCP Darlene Tate MD     Chief Complaint: Abdominal pain    S: Patient ASSESSMENT / PLAN:     1. Large Bowel Obstruction:  NPO, IVF's, management per general surgery  2. ? Dysphagia:  Cancel Upper GI until #1 is corrected  3. CKD:  Due to PCKD  4. HTN  5.  BPH    Plan of care: As above    Quality:  · DVT Prophylaxis: SCD'

## 2018-04-03 NOTE — PROGRESS NOTES
NURSING ADMISSION NOTE      Patient admitted via wheelchair. Oriented to room. Safety precautions initiated. Bed in low position. Call light in reach. Paged Dr. Mike Antunez and Dr. Almeida Just regarding new consults. Dr. Reyes Proper aware patient is here.  Adm

## 2018-04-03 NOTE — CM/SW NOTE
04/03/18 1400   CM/SW Referral Data   Referral Source Physician;Social Work (self-referral)   Reason for Referral Discharge planning   Informant Patient;Spouse; Children   Pertinent Medical Hx   Primary Care Physician Name [address]   Patient Info   Julio

## 2018-04-03 NOTE — PROGRESS NOTES
[address]  Report of GI Consultation    Charleen Anilabob Patient Status:  Inpatient    1940 MRN [id number]   [facility] 3NW-A Attending Natali Faustin MD   [facility] Day # 1 PCP Tom Flannery MD     Date of Admission:  2018  PCP: Mervat Sharif Tavist                  Confusion  Warfarin Sodium         Diarrhea  Zyprexa                     Comment:TABS; confusion      Physical Exam:   Blood pressure 100/59, pulse 80, temperature 98.7 °F (37.1 °C), temperature source Oral, resp.  rate 16 the left colon with diverticulosis of the distal left colon. There is some air and contrast noted within the rectum. No free air.      Dictated by: Ann Bermudez MD on 4/03/2018 at 11:08     Approved by: Ann Bermudez MD            Xr Colon, Sin stool ball measuring 13.5 cm on X ray, proximal to stricture and unable to removed endoscopically. He has not had any relief since gastrograffin enema or mineral oil enema (no BMs, but more pain)  3. Leukocytosis without lactic acidosis   4.  History of mu

## 2018-04-03 NOTE — PROGRESS NOTES
Patient transported off to [facility] by transport in stable condition. Will continue to monitor.

## 2018-04-03 NOTE — PROGRESS NOTES
Dr. Raphael Escobar on unit to see patient. Plan for patient to go down for colonoscopy tonight. Orders received for zosyn on call to OR room. IV placed to left forearm. Patient tolerated well. Orders received to place marcelino per Dr. Raphael Escobar.  Marcelino placed w

## 2018-04-03 NOTE — BH PROGRESS NOTE
[facility] Resource Referral Counselor Note    Clista Simple Patient Status:  Inpatient    1940 MRN [id number]   [facility] 3NW-A Attending Wendy Solano MD   Hosp Day # 1 PCP Darlene Tate MD       S(subjective) Patient adm

## 2018-04-03 NOTE — H&P
[facility]  History and Physical     [facility] Patient Status:  Inpatient    1940 MRN [id number]   [facility] 3NW-A Attending Fransisco Maldonado MD   Hosp Day # 0 PCP Dickson Johnson MD     Chief Complaint: Abdominal pain    His has never used smokeless tobacco. He reports that he does not drink alcohol or use drugs.     Family History:   Family History   Problem Relation Age of Onset   • Cancer Father      throat   • cardiac issue [OTHER] Mother    • brain [OTHER] Daughter    • ce MG-UNIT Oral Tab Take 1 tablet by mouth daily. Disp:  Rfl:    digoxin (LANOXIN) 0.125 MG Oral Tab Take 125 mcg by mouth See Admin Instructions.  Take Daily Mon, Tues, Wed, Thurs, Fri  Disp:  Rfl:    Multiple Vitamin (TAB-A-OMAIRA) Oral Tab Take 1 tablet by mo 0.8   TP  7.1       No results for input(s): PTP, INR in the last 72 hours. No results for input(s): TROP, CK in the last 72 hours. Imaging: Imaging data reviewed in Epic. ASSESSMENT / PLAN:     1.  Possible volvulus -going for emergent colonosco

## 2018-04-03 NOTE — OPERATIVE REPORT
Operative Report-Flexible Sigmoidoscopy with biopsy    PREOPERATIVE DIAGNOSIS/INDICATION:  1. Large bowel obstruction  2. Abdominal pain, n/v    POSTOPERTATIVE DIAGNOSIS:   1.  Fecal impaction with underlying ulcerated, and edematous mucosa without clear mass lesion. A few cold forces biopsies were obtained from this area. The colonoscope was not able to be advanced beyond the fecal impaction.  - RECTUM: Internal hemorrhoids, external hemorrhoids.   Go Haddad

## 2018-04-03 NOTE — ANESTHESIA PREPROCEDURE EVALUATION
PRE-OP EVALUATION    Patient Name: Matty Ellis    Pre-op Diagnosis: Rectal bleed [K62.5]    Procedure(s):  colonoscopy    Surgeon(s) and Role:     Minerva Jeffrey MD - Primary    Pre-op vitals reviewed.   Temp: 98.6 °F (37 °C)  Pulse: 79  Resp: 18  B Complex Vitamins (VITAMIN B COMPLEX) Oral Tab Take 1 tablet by mouth daily. Disp:  Rfl:    aspirin 81 MG Oral Tab EC Take 81 mg by mouth 2 (two) times daily.    Disp:  Rfl:    Calcium Carbonate-Vitamin D (CALTRATE 600+D) 600-400 MG-UNIT Oral Tab Take 1 tabl 04/02/2018   HCT 43.5 04/02/2018   MCV 98.4 04/02/2018   MCH 31.7 04/02/2018   MCHC 32.2 04/02/2018   RDW 13.9 04/02/2018   .0 04/02/2018       Lab Results  Component Value Date    04/02/2018   K 4.7 04/02/2018    04/02/2018   CO2 26.0 0

## 2018-04-04 NOTE — PROGRESS NOTES
Patient wife believes patient is acting different, like how he did when he was having brain bleeds in the past. Wife reports patient is shaking, slurring his speech, and very confused. Upon assessment, patient was alert and orientated to person and time.  Jyothi Joiner

## 2018-04-04 NOTE — PALLIATIVE CARE NOTE
PC Consult. PCSW reviewed initial assessment completed by Unit JONNA/Colleen. PC APN to follow-up for goals of care discussion; SW will follow-up based on the outcome of that discussion.

## 2018-04-04 NOTE — CONSULTS
Neurology H&P    Gely Darnell Patient Status:  Inpatient    1940 MRN [id number]   [facility] 3NW-A Attending Gris Ward MD   [facility] Day # 2 PCP Vesna Diaz MD     Subjective:  Gely Darnell is a(n) 76year old male with a PMH signif bowel obstruction) ([facility] [address]     Volvulus ([facility] [address]     Vascular dementia with behavior disturbance     Leukocytosis     Dysphagia     Large bowel obstruction (HCC)      PMHx:  Past Medical History:   Diagnosis Date   • Arrhythmia     A fib   • Bleeding     Brain wife at bedside, R lower facial weakness, tongue appears midline  MSK: UE seem to have no focal weakness at leats 4/5, LE antigravity and 4/5 in HF and preserved DF  Sens: intact to LT, no double simultaneous extinction  DTR: 2+ in biceps, unable to elicit electrographic seizures   were noted on this awake and drowsy recording. EEG 9/23/15  IMPRESSION: Abnormal EEG due to diffuse continuous slowing which   may represent encephalopathy of non-specific etiology or a   medication effect;  However, there we

## 2018-04-04 NOTE — HISTORICAL OFFICE NOTE
BRE BEAN  : 1940  ACCOUNT:  [id number]  [phone]  PCP: Dr. Cristel Grady     TODAY'S DATE: 2018  DICTATED BY:  KRISTA Boyer]      CHIEF COMPLAINT: [Followup of .  Heart failure, systolic, Followup of Chronic atrial fibrillation, Followup (LV lead only, pacing via RV port), (Sept. Mela Conrads), atrial fibrillation, cardiac catheterization April 2000, brown villanueva, ICD (Medtronic) 12/2005,  downgrade to pacemaker (Medtronic)  1/6/17, mitral regurgitation, mitral valve repair 2000, MV atrial fibrillation  7. CVA-1994  8. Dilated cardiomyopathy  9. Fatigue/malaise  10. Hypercholesteremia, pure  11. Hypertension, benign  12. Hypertension, benign without CHF  13. Hypertension, white coat  14. ICD reprogramming  15.  Pacemaker, not dependent 03/26/10 Aspirin               81MG      Takes 2 by twice daily                   01/18/10 Calcium               600MG     1 po qd                                  09 Multivitamins                   1 CAPSULE DAILY.          BRE BEAN  :  peripheral edema, see CV exam. RESP: denies PND / orthopnea. GI: denies melena, hematochezia. : no hematuria. INTEG: no new rashes, lesions. MS: no trauma. NEURO: denies lightheadedness or dizziness. HEM/LYMPH: denies easy bruising.  ALL: no new food or e bilaterally. DECISION MAKING: I told Ms. Kris Painter that Joseph's swelling may be unrelated to his heart, and, in fact, it is more likely to be related to a dependent position of his legs.   I told her that I agree with Dr. Demario Rojo about ordering an echocardiogra 1 capsule by mouth daily                 12/04/15 Vitamin C             500MG     1 capsule by mouth daily                 12/04/15 Vitamin D             2000UNIT  1 tablet by mouth daily                  05/08/14 Vitamin B-Complex               1 ta

## 2018-04-04 NOTE — PROGRESS NOTES
Verified with Dr. Jose Roberto Ruiz if he would like to continue to hold heparin, reports it is ok to give dose of heparin tonight. Will implement.

## 2018-04-04 NOTE — SLP NOTE
Attempted to see pt for speech therapy services. Per general surgery, pt to remain strict NPO at this time. Will continue to follow.     Dirk Slater 87 CCC-SLP  Pager [phone]

## 2018-04-04 NOTE — SLP NOTE
Contacted RN re: pt for a BSSE / RN reports that pt is in a weakened state and MD's are deciding if pt is appropriate for a colonoscopy.   Will check on pt in AM re: status of case and f/u of referral.

## 2018-04-04 NOTE — ICD/PM
EP records reviewed. Medtronic bi-V pacer implant 1/2017, ICD generator removed, and ICD lead capped and abandoned. No change needed for device perioperatively. Not pacer dependent.

## 2018-04-04 NOTE — PROGRESS NOTES
[facility]  Progress Note     Zackery Lewis Patient Status:  Inpatient    1940 MRN [id number]   [facility] 3NW-A Attending Inocente Homans, MD   Hosp Day # 2 PCP Herbie Hernandez MD     Chief Complaint: Abdominal pain    S: Patient 2 times per day   • metoprolol Tartrate  5 mg Intravenous Q6H   • maalox/diphenhydramine/sucralfate  10 mL Oral TID AC and HS   • piperacillin-tazobactam  3.375 g Intravenous Q8H       ASSESSMENT / PLAN:     1. Large Bowel Obstruction  1.  Flex sig 4/2: fec

## 2018-04-04 NOTE — PHYSICAL THERAPY NOTE
Attempted PT eval at this time however Pt off floor at xray. Will re-attempt later today as time allows.

## 2018-04-04 NOTE — PROGRESS NOTES
1445 - patient returned from xray at this time in stable condition. 1510 - call received from [facility] stating that the patient is having increased pain, and its harder to breath. Elevated HR, 130. Elevated /92.    1514 - 2 mg morphine given.  K

## 2018-04-04 NOTE — PROGRESS NOTES
Spoke with Dr. Miles Horvath, would like speech to complete a bedside swallow with a small amount of liquid to determine if the patient could safely swallow so Golytely can be administered. Speech therapy paged.      [address] Derrick Verduzco from speech called back writing

## 2018-04-04 NOTE — CONSULTS
Consult noted, chart reviewed. Met with pt and spouse. Discussed options at length. Spouse remains hopeful bowels will start moving and surgery can be avoided. Awaiting results of EEG and obstructive series/enemas prior to making any decisions.   Full n Confusion  Warfarin Sodium         Diarrhea  Zyprexa                     Comment:TABS; confusion    Medications: Complete list reviewed. Active palliative care medications include morphINE sulfate, ondansetron HCl.     Review of Systems: pain to abd, jass ongoing goals of care discussions    A total of 60 minutes were spent on this consult; >50% was spent counseling and coordinating care. Thank you for allowing the Palliative Care Team to participate in the care of your patient.   We will continue to follow

## 2018-04-04 NOTE — PROGRESS NOTES
[facility]  Progress Note    Mariama Rao Patient Status:  Inpatient    1940 MRN [id number]   [facility] 3NW-A Attending Master Hartley MD   Hosp Day # 2 PCP Anurag Conley MD     Subjective:  Pt resting in bed, wife at bedside, p apnea     Esophageal reflux     Hyperlipidemia     HTN (hypertension)     TIA (transient ischemic attack)     Stroke due to intracerebral hemorrhage (HCC)     Recurrent major depressive disorder (HCC)     Benign prostatic hyperplasia     Hyperglycemia my opinion. Currently patient awake and alert and conversant, despite wife claiming he is more agitated. No acute changes on CT head.     Patient still unclear whether he wants to proceed with surgery or proceed with comfort care/palliative care    [facility]

## 2018-04-04 NOTE — PROCEDURES
RYAN - ELECTROENCEPHALOGRAM (EEG) REPORT  Patient Name:  João Finnegan   MRN / CSN:  [id number] / [id number]   Date of Birth / Age:  12/28/1940 /  76year old   Encounter Date:  4/4/18         METHODS:  Twenty-two electrodes were applied according to the 10-20-e [facility]

## 2018-04-04 NOTE — CONSULTS
[facility]  Report of Consultation    Alan Nael Patient Status:  Inpatient    1940 MRN [id number]   [facility] 3NW-A Attending Emir Wei MD   [facility] Day # 2 PCP Dickson Johnson MD     Reason for Consultation:  Preoperative c pericardial bioprosthesis was     present and functioning normally. The prosthesis had a normal range of     motion. The sewing ring appeared normal, had no rocking motion, and     showed no evidence of dehiscence. There was trivial regurgitation.  Mean     balance mood,             experienced significant swelling in legs, also has             CHF  Doxycycline               Statins                   Tavist                  Confusion  Warfarin Sodium         Diarrhea  Zyprexa                     Comment:TABS; bruits. Cardiac: irreg irreg, 2/6 systolic murmur, no rub or gallop. Lungs: Clear without wheezes, rales, rhonchi or dullness. Normal excursions and effort. Abdomen: Soft, mildly distended, lower abdomen tenderness, BS +. Extremities: trace edema.   Pe Leandro Kellogg MD on 4/03/2018 at 11:08     Approved by: Brooke Mason MD            Xr Colon, Single Contrast (cpt=74270)    Result Date: 4/2/2018  PROCEDURE:  XR COLON, SINGLE CONTRAST (CPT=74270)       CONCLUSION:   Moderate stenosis in the mid sigmoid c simple to mildly complex appearance. These may be followed with postcontrast imaging. Moderately distended bladder with a trabecular contour may relate to prior bladder outlet obstruction. Small bilateral inguinal hernias containing fat.   Critical resul lopressor  - no need for additional cardiac testing with stable compensated mild non-ischemic cardiomyopathy and LVEF 50%  - no active cardiac sx and afib is controlled  - pt cleared for bowel surgery with low to moderate perioperative cardiac risk  - foll

## 2018-04-04 NOTE — PROGRESS NOTES
[facility]  Report of GI Consultation    Gely Mann Patient Status:  Inpatient    1940 MRN [id number]   [facility] 3NW-A Attending Gris Ward MD   [address] Day # 2 PCP Vesna Diaz MD     Date of Admission:  2018  PCP: Corina Crews swelling in legs, also has             CHF  Doxycycline               Statins                   Tavist                  Confusion  Warfarin Sodium         Diarrhea  Zyprexa                     Comment:TABS; confusion      Physical Exam:   Blood pressure 11 large amount of stool is noted in the right and transverse colon which are mildly distended. There is some residual contrast and moderate stool in the left colon with diverticulosis of the distal left colon.   There is some air and contrast noted within th prior exam.  Sequelae of chronic small vessel ischemic disease is noted. No evidence of intracranial hemorrhage or extra-axial fluid collection. Agree with preliminary radiology report from [facility] [address] [place] [address] radiology.     Dictated by: Leslye Ohara MD on 4/04/ less pain and less distention, with some passage of liquid stool with small solid debris. Agree with continuing enemas for now. 2. Needs bedside swallow eval, with instructions to minimize amount ingested.   If passes, can consider initiation of small barbara

## 2018-04-05 NOTE — PALLIATIVE CARE NOTE
1808 Jase Mathias Follow Up      Joy Good  [id number]       Assessment/Recommendations:  Prognosis/Goals of Care: . Asked to meet with pt's spouse and daughter again about options for care.   Discussed the difference between palliati

## 2018-04-05 NOTE — PHYSICAL THERAPY NOTE
PHYSICAL THERAPY EVALUATION - INPATIENT     Room Number: 625/058-K  Evaluation Date: 4/5/2018  Type of Evaluation: Initial  Physician Order: PT Eval and Treat    Presenting Problem: Encephalopathy and Bowel Obstruction   Reason for Therapy: Mobility Enter : 0     Stairs to Bedroom: 14  Railing: Yes    Lives With: Spouse  Drives: No  Patient Owned Equipment: Cane       Prior Level of Grayson: Per wife pt is mod I in his mobilites at home with wife assisting his needs as needed.  Stated that wife wo help from another person does the patient currently need. ..   -   Moving to and from a bed to a chair (including a wheelchair)?: A Lot   -   Need to walk in hospital room?: A Lot   -   Climbing 3-5 steps with a railing?: A Lot       AM-PAC Score:  Raw Scor considered moderate. The patient is below baseline and would benefit from skilled inpatient PT to address the above deficits to assist patient in returning to prior to level of function.  Goal is to home and back to baseline with wife being able to leave h

## 2018-04-05 NOTE — PROGRESS NOTES
PATIENT HAS IV FLUIDS INFUSING, IV ZOSYN SCHEDULED, ORDERS FOR STRICT NPO BUT DR. SHEETS AND DR. YOUNG GAVE ORDERS TO GIVE SIPS OF GOLYTELY THROUGHOUT THE DAY (OK NOT TO HAVE SPEECH THERAPY CLEAR PATIENT FIRST), ON ROOM AIR, FORGETFUL, ANXIOUS AT TIMES, ON T

## 2018-04-05 NOTE — PROGRESS NOTES
[facility]  Progress Note    Bobby Shabazz Patient Status:  Inpatient    1940 MRN [id number]   [facility] 3NW-A Attending Pastor Fierro MD   Hosp Day # 3 PCP Joseph Owens MD     Subjective:   [facility]  No intake/output data recorded. PROCEDURE:  XR THERAPEUTIC ENEMA WITH OR WITHOUT AIR  (VML=86143)     TECHNIQUE:     COMPARISON:  None.      INDICATIONS:  Sigmoid stricture, partial large bowel obstruction, large stool ball possibly in a ps stool seen within   nondistended distal sigmoid colon and rectum. There are numerous gas-filled loops of nondistended small bowel seen throughout the small bowel.   The sheath     Atherosclerotic vascular calcifications are seen in the aorta and aortic bra likely due to diverticular stricture, although pt has never completed a colonoscopy     Plan:  1. Continue strict NPO  2.  Further discussion with the patient and family today reveals that they are still uncertain about whether the are interested in surgery tympanetic    Assesment/Plan:  So far patient and family cannot come to conclusion about whether to proceed with surgery or elect comfort care/palliative care/ hospice.      Two gastrograffin enemas and multiple mineral oil and lactulose enemas have had min

## 2018-04-05 NOTE — PLAN OF CARE
SAFETY ADULT - FALL    • Free from fall injury Progressing          PAIN - ADULT    • Verbalizes/displays adequate comfort level or patient's stated pain goal Progressing          GASTROINTESTINAL - ADULT    • Minimal or absence of nausea and vomiting Prog

## 2018-04-05 NOTE — PROGRESS NOTES
Neurology Progress Note    Tj Waite Patient Status:  Inpatient    1940 MRN [id number]   [facility] 3NW-A Attending Terrence Estrada MD   [facility] Day # 3 PCP Tammy Marsh MD     Subjective:  Tj Waite is a(n) 76year old male with any surgical complications. States that \"As I see it I have a 50/50 chance of making it\". Family plan to talk to palliative care again for further information.  Discussed that he is at increased risk of stroke with procedure given his history and cardiac

## 2018-04-05 NOTE — SLP NOTE
Attempted to see pt for speech therapy services. Per general surgery, pt to remain strict NPO at this time.   Will continue to follow.     Wilmer Haas Bryon 87 CCC-SLP  Pager [phone]

## 2018-04-05 NOTE — PALLIATIVE CARE NOTE
PCSW reviewed PC KRISTA/Ailyn's note stating that the plan is to o see results of EEG and see if enemas help to relieve obstruction before making further decisions. Pending surgery or not. Plan is to continue goals of care discussions.  PCSW will follow-up bas

## 2018-04-05 NOTE — PROGRESS NOTES
[facility]  Progress Note     Matty Ellis Patient Status:  Inpatient    1940 MRN [id number]   [facility] 3NW-A Attending Karime Cartwright MD   Hosp Day # 3 PCP Cristel Grady MD     Chief Complaint: Abdominal pain    S: Patient PLAN:     1. Large Bowel Obstruction  1. Flex sig 4/2: fecal impaction with ulceration of sigmoid colon and severe diverticulosis with diverticular stricture  2. Start golytely  3. Surgery following  4.  Medically cleared to proceed with surgery if necessar

## 2018-04-05 NOTE — PROGRESS NOTES
[facility]  Cardiology Progress Note    Subjective:  No chest pain or shortness of breath. Remains undecided about surgery at present. Reportedly more alert today.     Objective:  /95 (BP Location: Left arm)   Pulse 106   Temp 97.8 °F (36.6 °C) respect pt's wishes and treat him conservatively and may be he will do well with medical approach. He is cleared cardiac wise for abdomial surgery if needed. Cardiac wise stable and compensated.  Controlled chronic atrial fib on baby ASA outpatient, no coum

## 2018-04-06 NOTE — PROGRESS NOTES
Patient drowsy, able to follow commands and answer questions. Denies that he is in any pain. Appears to be comfortable to writing RN and family at bedside. PICC line placed in right arm, now right arm precautions.  Leyva catheter in, draining clear, yellow

## 2018-04-06 NOTE — PROGRESS NOTES
04/05/18 1921   Clinical Encounter Type   Visited With Family; Patient and family together   Routine Visit Introduction   Continue Visiting Yes  (patient not feeling well at time of visit)   Presybeterian Encounters   Presybeterian Needs Prayer   Patient Spiritu

## 2018-04-06 NOTE — HOSPICE RN NOTE
Hospice MSW and myself met with this patients family in patients room to discuss hospice care. Family interested in taking him home over the weekend if he remains stable to transfer . Hospice to place order for DME/Meds for delivery later today.  Would like

## 2018-04-06 NOTE — PROGRESS NOTES
04/06/18 1800   Clinical Encounter Type   Visited With Patient and family together   Crisis Visit (Pt. went into hospice today, and nurse requested  visit for famil who was very sad)   Referral From Nurse   Referral To    Patient Temo

## 2018-04-06 NOTE — SPIRITUAL CARE NOTE
CHP present this date, Pt new admit to [facility] GIP LOC. Pt minimally responsive but engaged. Family present and able to share and process their feelings in an appropriate manner at this time. They are sad but present as realistic and accepting.  CHP supports pt

## 2018-04-06 NOTE — PROGRESS NOTES
Per pt family's pt is less alert than he has been. Patient not opening eyes, writing RN asked pt to open his eyes and he replied \"I'm dead. \" Patient able to follow commands, wiggle toes and squeeze fingers. Vital signs stable. Blood sugar 159.  Patient's

## 2018-04-06 NOTE — PLAN OF CARE
GASTROINTESTINAL - ADULT    • Minimal or absence of nausea and vomiting Progressing    • Maintains or returns to baseline bowel function Progressing        Impaired Swallowing    • Minimize aspiration risk Progressing        PAIN - ADULT    • Verbalizes/di

## 2018-04-06 NOTE — PALLIATIVE CARE NOTE
Per HIEN VELASCO/Ailyn's note the plan is to re-address goals of care this morning with pt and family. PCSW will follow-up based on the outcome of that discussion. Unit JONNA/Annemarie notified of the above via page.     10:43am: HIEN VELASCO/Ailyn notified PCSW that per

## 2018-04-06 NOTE — SLP NOTE
Attempted to see pt for SLP services. Per RN, pt remains strict NPO and hospice is being considered today. SLP to continue to f/u as appropriate.    Tangela Khan M.S., CCC-SLP/L

## 2018-04-06 NOTE — PROGRESS NOTES
Plans for home with hospice noted. Will sign off.  Does not have a defibrillator, and no action needed for PPM.

## 2018-04-06 NOTE — PALLIATIVE CARE NOTE
1802 Jase Mathias Follow Up      Jennifer Cisneros  [id number]       Patient seen and evaluated, family at bedside. Pt more lethargic this morning.       ROS: Denies pain, nausea or dyspnea    Physical Exam:  GEN:  NAD  Neuro:  Drowsy, answ

## 2018-04-06 NOTE — PROGRESS NOTES
Pt up to commode, small amount of brown liquid output. Attempted to start enema, wife and daughter stated they would like to hold off on enema at this time. Will discuss attempting enema gain later tonight.

## 2018-04-06 NOTE — PROGRESS NOTES
04/06/18 5626   Clinical Encounter Type   Visited With Family  (wife and daughter were present)   Routine Visit Follow-up   Continue Visiting (Empowered to call  anytime for continuity of care)    intern Champs provided emotional and spi

## 2018-04-06 NOTE — PROGRESS NOTES
Call received from Dr. Heber Bashir, radiologist, states that free air in the abdomen is seen on x-ray. 46 - Dr. Riana Self paged with results, waiting for call back. 56 - Dr. Riana Self called back, no new orders.

## 2018-04-07 NOTE — HOSPICE RN NOTE
F/U visit done- Wife reported when patient was repositioned that he seemed to experience discomfort after this. Family in agreement with administering IVP dose of Morphine 2mg prior to any repositioning.

## 2018-04-07 NOTE — PROGRESS NOTES
During pt round, patient was resting peacefully. Resp normal 16, no signs for trouble breathing or rales. Family resting as well. 6645 pt unresponsive. Breathing comfortably. resp 16. Pedal and radial pulses present. Extremities warm to touch.  BLE pale

## 2018-04-07 NOTE — PROGRESS NOTES
2000 Upon assessment pt was resting in bed with family at bedside. Family requested more MSO4 for pain and pt stated he was having a little pain when asked by RN. Additional dose of MSO4 given. Pt states pain is in his back.  Family states he has been wig called RN because pt sounds like he needs to cough but can not. Pt repositioned to sit up better to attempt a good cough. Pt to weak to cough and didn't follow command at all. Family stated they have been using the [address] but he still sounds the same.  Sco

## 2018-04-07 NOTE — PLAN OF CARE
Pt has been resting quietly - per family with occasional moan but then back to non-responsive state. Morphine is continuous via PCA at 2mg/hr. Vitals are stable.

## 2018-04-07 NOTE — HOSPICE RN NOTE
GIP (inpatient) hospice day 2- Family at bedside. Patient not opening eyes at all so far today per family. Morphine drip started overnight and is currently infusing at 2mg/hr. PRN atropine/ativan/zofran needed in the last 24 hours since hospice admission.

## 2018-04-07 NOTE — DISCHARGE SUMMARY
Admission date: 4/2/18    Discharge date: 4/6/18    Pt discharged and readmitted to inpatient hospice.     Nimo Tyler MD

## 2018-04-07 NOTE — PROGRESS NOTES
[facility]  Progress Note     Mariama Rao Patient Status:  Inpatient    1940 MRN [id number]   [facility] 3NW-A Attending Master Hartley MD   Hosp Day # 1 PCP Anurag Conley MD     Chief Complaint: Abdominal pain    S: Patient

## 2018-04-07 NOTE — PLAN OF CARE
Pt was repositioned at approx 1500; face washed; thorough oral care done including Yankauer suctioning- pt had been gurgling before repositioning but eased afterwards.   Pt did not moan or appear uncomfortable for the 10 minutes this RN was in the room afte

## 2018-04-08 NOTE — PROGRESS NOTES
[facility]  Progress Note     Gely Darnell Patient Status:  Inpatient    1940 MRN [id number]   [facility] 3NW-A Attending Kevin Alcocer MD   Hosp Day # 2 PCP Vesna Diaz MD     Chief Complaint: Abdominal pain    S: Patient MD

## 2018-04-08 NOTE — HOSPICE RN NOTE
GIP day 3- patient unresponsive, RR 12/minute/non labored, regular. Respiratory secretions present. Atropine being given prn. Robinul available q3h prn as well and may work better than than Atropine.   Patients wife stated emotionally, she felt better after

## 2018-04-08 NOTE — PLAN OF CARE
Pt with labored breathing throughout night. Pt's daughter states he feels warm,. Temp & vs checked per agreement of family. Axillary temp 103.5. Tylenol suppository given for fever reduction & comfort.  Pt repositioned in bed, oral care performed  & HOB monica

## 2018-04-09 NOTE — PLAN OF CARE
PAIN - ADULT    • Verbalizes/displays adequate comfort level or patient's stated pain goal Progressing          Low blood pressure.  Patient remains non-verbal. Patient in beginning of shift appears comfortable, low respiration rate at 6 per minute, slight

## 2018-04-09 NOTE — PROGRESS NOTES
0730: Writing RN assumed care of patient at this time. No respirations or pulse noted. Hospice and spiritual care at bedside with family. 1105: All of family's questions answered. Support and appropriate resources provided. Patient care performed.  Scop

## 2018-04-09 NOTE — PROGRESS NOTES
Pt seen and examined. No heart sounds. No respirations. Eyes are non-responsive. Family at bedside. Discussed with them in detail.       Jacquelin Smith MD

## 2018-04-09 NOTE — PROGRESS NOTES
Family called writer into room reporting they believe patient has passed. Patient is a DNR on hospice care with comfort measures. Upon assessment, writer was unable to hear any heart sounds apically for a full minute and no pulse was present.  No respiration

## 2018-04-09 NOTE — PROGRESS NOTES
4/8/18 Pt resting in bed at this time with family at bedside. Pt remains unresponsive. 1L 02 per nc. Pt does not open eyes or speak. NPO. HOB up 30 degrees. Aspiration, fall precautions, and right arm precautions maintained.  Leyva catheter with no urine ou

## 2018-04-09 NOTE — PROGRESS NOTES
04/09/18 0739   Clinical Encounter Type   Visited With Family   Routine Visit (Paged by RN to be with family)   Continue Visiting No   Crisis Visit Death   Patient's Supportive Strategies/Resources  provided support in the midst of dying and bill

## 2018-04-09 NOTE — PLAN OF CARE
Problem: PAIN - ADULT  Goal: Verbalizes/displays adequate comfort level or patient's stated pain goal  INTERVENTIONS:  - Assess pain using appropriate pain scale  - Administer analgesics based on type and severity of pain and evaluate response  - Implement

## 2018-04-12 NOTE — DISCHARGE SUMMARY
[facility]  DISCHARGE SUMMARY     Ahsan Cody Patient Status:  Inpatient    1940 MRN [id number]   [facility] 3NW-A Attending No att. providers found   Hosp Day # 3 PCP Mathieu Baker MD     Date of Admission: 2018  Date of the patient and family. Decision was made to eventually proceed with hospice level of care. The patient eventually had perforation and passed from septic shock while in hospice.       Procedures during hospitalization:   · Flex sig    Consultants:  • GI a

## 2019-02-28 VITALS
BODY MASS INDEX: 23.46 KG/M2 | WEIGHT: 146 LBS | SYSTOLIC BLOOD PRESSURE: 120 MMHG | HEIGHT: 66 IN | DIASTOLIC BLOOD PRESSURE: 76 MMHG | HEART RATE: 80 BPM

## 2019-02-28 VITALS
HEIGHT: 66 IN | HEART RATE: 80 BPM | SYSTOLIC BLOOD PRESSURE: 100 MMHG | WEIGHT: 145 LBS | DIASTOLIC BLOOD PRESSURE: 62 MMHG | BODY MASS INDEX: 23.3 KG/M2

## 2019-02-28 VITALS
WEIGHT: 141 LBS | BODY MASS INDEX: 22.66 KG/M2 | HEART RATE: 70 BPM | DIASTOLIC BLOOD PRESSURE: 68 MMHG | SYSTOLIC BLOOD PRESSURE: 118 MMHG | HEIGHT: 66 IN

## 2019-02-28 VITALS
SYSTOLIC BLOOD PRESSURE: 108 MMHG | HEART RATE: 80 BPM | BODY MASS INDEX: 22.82 KG/M2 | DIASTOLIC BLOOD PRESSURE: 58 MMHG | WEIGHT: 142 LBS | HEIGHT: 66 IN

## 2019-02-28 VITALS — DIASTOLIC BLOOD PRESSURE: 80 MMHG | SYSTOLIC BLOOD PRESSURE: 127 MMHG

## 2019-02-28 VITALS
BODY MASS INDEX: 23.63 KG/M2 | SYSTOLIC BLOOD PRESSURE: 108 MMHG | WEIGHT: 147 LBS | HEIGHT: 66 IN | HEART RATE: 80 BPM | DIASTOLIC BLOOD PRESSURE: 60 MMHG

## 2019-03-01 VITALS
WEIGHT: 151 LBS | DIASTOLIC BLOOD PRESSURE: 60 MMHG | BODY MASS INDEX: 24.27 KG/M2 | HEART RATE: 80 BPM | HEIGHT: 66 IN | SYSTOLIC BLOOD PRESSURE: 108 MMHG | OXYGEN SATURATION: 98 %

## 2019-03-01 VITALS
HEIGHT: 66 IN | DIASTOLIC BLOOD PRESSURE: 62 MMHG | HEART RATE: 80 BPM | SYSTOLIC BLOOD PRESSURE: 100 MMHG | BODY MASS INDEX: 24.27 KG/M2 | WEIGHT: 151 LBS

## 2019-03-01 VITALS
HEIGHT: 66 IN | SYSTOLIC BLOOD PRESSURE: 94 MMHG | HEART RATE: 68 BPM | WEIGHT: 158 LBS | DIASTOLIC BLOOD PRESSURE: 56 MMHG | BODY MASS INDEX: 25.39 KG/M2

## 2019-03-01 VITALS
WEIGHT: 155 LBS | BODY MASS INDEX: 24.91 KG/M2 | HEIGHT: 66 IN | SYSTOLIC BLOOD PRESSURE: 112 MMHG | HEART RATE: 70 BPM | DIASTOLIC BLOOD PRESSURE: 70 MMHG

## 2019-12-27 NOTE — H&P
[facility]  H&P    Sandy Medina Patient Status:  Outpatient in a Bed    1940 MRN [id number]   [facility] Thierry [facility]   Hosp Day # 0 PCP Gee Harris MD     R Comment Eye muscle surgery    CARDIAC PACEMAKER PLACEMENT      CATARACTS, OPHTH ([facility])      Comment Bilateral     Family History   Problem Relation Age of Onset   • Cancer Brother    • Cancer Father      throat   • cardiac issue[other] [OTHER] Mother    • b Lab Results  Component Value Date   INR 1.23* 08/26/2015   INR 1.23* 08/25/2015         Carolina Noyola MD  1/6/2017  1:38 PM    Vik Wilde MD  [facility]  Cardiac Electrophysiolgy declines

## (undated) DEVICE — SUTURE ETHILON 2-0 FS

## (undated) DEVICE — SOL  .9 1000ML BTL

## (undated) DEVICE — VIOLET BRAIDED (POLYGLACTIN 910), SYNTHETIC ABSORBABLE SUTURE: Brand: COATED VICRYL

## (undated) DEVICE — SIGMOIDOSCOPE DISP STERILE

## (undated) DEVICE — 3M™ RED DOT™ MONITORING ELECTRODE WITH FOAM TAPE AND STICKY GEL, 50/BAG, 20/CASE, 72/PLT 2570: Brand: RED DOT™

## (undated) DEVICE — LAPAROTOMY SPONGE - RF AND X-RAY DETECTABLE PRE-WASHED: Brand: SITUATE

## (undated) DEVICE — SOL  .9 3000ML

## (undated) DEVICE — CHLORAPREP 26ML APPLICATOR

## (undated) DEVICE — STERILE POLYISOPRENE POWDER-FREE SURGICAL GLOVES: Brand: PROTEXIS

## (undated) DEVICE — 3M(TM) MICROPORE TAPE DISPENSER 1535-2: Brand: 3M™ MICROPORE™

## (undated) DEVICE — TUBING CYSTO

## (undated) DEVICE — CLOSING BUNDLE: Brand: MEDLINE INDUSTRIES, INC.

## (undated) DEVICE — Device: Brand: DEFENDO AIR/WATER/SUCTION AND BIOPSY VALVE

## (undated) DEVICE — SYRINGE 30ML LL TIP

## (undated) DEVICE — 1200CC GUARDIAN II: Brand: GUARDIAN

## (undated) DEVICE — SUTURE PROLENE 2-0 SH

## (undated) DEVICE — SOL H2O 1000ML BTL

## (undated) DEVICE — SUTURE VICRYL 0

## (undated) DEVICE — LAPAROTOMY CDS: Brand: MEDLINE INDUSTRIES, INC.

## (undated) DEVICE — KENDALL SCD EXPRESS SLEEVES, KNEE LENGTH, MEDIUM: Brand: KENDALL SCD

## (undated) DEVICE — ENDOSCOPY PACK - LOWER: Brand: MEDLINE INDUSTRIES, INC.

## (undated) DEVICE — DRAIN CHANNEL 19FR BLAKE

## (undated) DEVICE — BETADINE SOLUTION 8 OZ BTL

## (undated) DEVICE — DRAPE LEGGING STERILE

## (undated) DEVICE — DRAIN RELIAVAC 100CC

## (undated) DEVICE — PROXIMATE SKIN STAPLERS (35 WIDE) CONTAINS 35 STAINLESS STEEL STAPLES (FIXED HEAD): Brand: PROXIMATE

## (undated) DEVICE — FILTERLINE NASAL ADULT O2/CO2

## (undated) DEVICE — BLADE ELECTRODE: Brand: EDGE

## (undated) DEVICE — SUTURE PDS II 1 CT-1

## (undated) DEVICE — FORCEP BIOPSY RJ4 LG CAP W/ND

## (undated) NOTE — MR AVS SNAPSHOT
[address]  [address] [address] [phone]               Thank you for choosing us for your health care visit with Yulisa Elias MD.  We are glad to serve you and happy to provide you with this Exam - Established Patient with Timoteo Rosen, [facility] ([address])    [address] [address] [phone]           Please come 15 minutes early to fill out paperwork.  Also, bring all y Take 100 mg by mouth 2 (two) times daily. Commonly known as: Toprol XL           omega-3 fatty acids 1000 MG Caps   Take 1,200 mg by mouth daily. Commonly known as:  FISH OIL           spironolactone 25 MG Tabs   12.5 mg daily.  1/2 tablet daily   Comm

## (undated) NOTE — MR AVS SNAPSHOT
[address]  [address] [id number] [phone]               Thank you for choosing us for your health care visit with Shelia Maldonado MD.  We are glad to serve you and happy to provide you with this This list is accurate as of: 6/21/17  1:07 PM.  Always use your most recent med list.                Acidophilus/Pectin Caps   Take 1 capsule by mouth daily.    Commonly known as:  PROBIOTIC           aspirin 81 MG Tbec   Take 81 mg by mouth 4 (four) times Information about where to get these medications is not yet available     !  Ask your nurse or doctor about these medications    - [address] can access your MyChart to more actively manage your heal OUTSIDE SAFETY TIPS  ? Always wear good shoes with proper support and traction. ? Always use hand rails on stairs and escalators. ? Cover porch steps with gritty weather proof paint.   ? Pay attention to curbs and other changes in surfaces when out in the

## (undated) NOTE — LETTER
Vianney Martinez 182 [address] [facility], [address] [facility]     PICC LINE INSERTION CONSENT     I agree to have a Peripherally Inserted Central Catheter (PICC) placed in my arm.    1. The PICC insertion procedure, care, maintenance, risks, benefits, and c goals; and potential problems that might occur during recuperation.  They also discussed reasonable alternatives to the PICC, including risks, benefits, and side effects related to the alternatives and risks related to not receiving this procedure      ____

## (undated) NOTE — IP AVS SNAPSHOT
[facility] [place] [address] [place], [address] ~ [phone]                Discharge Summary   1/6/2017    Mr. Licea Simple           Admission Information        Provider Department    1/6/2017 Isak Avila MD  Celeste Suit Commonly known as:  LANOXIN        Take 125 mcg by mouth See Admin Instructions. Take Daily Mon, Tues, Wed, Thurs, Fri      [    ]    [    ]    [    ]    [    ]       ezetimibe 10 MG Tabs   Commonly known as:  ZETIA        Take 10 mg by mouth nightly.  Alte Specialties:  Cardiovascular Diseases, CARDIOLOGY    Why:  Follow up with the pacemaker clinic in one week.     Contact information:    [address] [facility]  [address]        Immunization History - If you have concerns related to behavioral health issues or thoughts of harming yourself, contact [address] [place] [address] at [phone].     - If you don’t have insurance, [employer] has partnered with Patient [employer] Roxie Use: Treat infections or suspected infection   Most common side effects:  Allergic reactions, rash, nausea, diarrhea   What to report to your healthcare team: Tolerance of medications, temperature, rash, itching, shortness of breath, chills, nausea, and conner Most common side effects: Stomach upset   What to report to your healthcare team: Stomach upset, unresolved pain           GI Medications     Acidophilus/Pectin Oral Cap       Use:  Nausea/vomiting, acid reflux, low bowel motility, stomach pain   Most comm

## (undated) NOTE — MR AVS SNAPSHOT
[address]  [address] [facility], [address] [address] [phone]               Thank you for choosing us for your health care visit with Aga Mir MD.  We are glad to serve you and happy to provide you with this Neck pain of over 3 months duration        Paresthesias          Instructions and Information about Your Health     None      Allergies as of Apr 20, 2017     Doxycycline     Statins     Tavist Confusion    Warfarin Sodium Diarrhea    Zyprexa     TABS; co Take 20 mg by mouth daily. Commonly known as:  DEMADEX           Vitamin B Complex Tabs   Take 1 tablet by mouth daily. vitamin C 100 MG Tabs   Take 300 mg by mouth daily.                    MyChart     Visit [facility]  You can access your MyChart ? If you have pets, be careful that you don’t trip over them. OUTSIDE SAFETY TIPS  ? Always wear good shoes with proper support and traction. ? Always use hand rails on stairs and escalators. ? Cover porch steps with gritty weather proof paint.   ? Pay

## (undated) NOTE — ED AVS SNAPSHOT
[facility] Emergency Department  [place] [address] [address]  Phone:  [phone]  Fax:  [phone]          Mr. Fercho Hess   MRN: [id number]    Department:  [facility] Emergency Department   Date of Visit:  7/1/2017 CARE PHYSICIAN AT ONCE OR RETURN IMMEDIATELY TO THE EMERGENCY DEPARTMENT.     If you have been prescribed any medication(s), please fill your prescription right away and begin taking the medication(s) as directed    If the emergency physician has read X-ray

## (undated) NOTE — LETTER
[facility]  Chon Ford 59 [address] [facility], [address] [place] [address]    Consent for Operation    Date: __________________    Time: _______________    1.  I authorize the performance upon Mario Meyers the following operation:    * COLONOSCOPY    2. I authorize Dr. Marbella Monroy videotape. [facility] will not be responsible for storage or maintenance of this tape. 6. For the purpose of advancing medical education, I consent to the admittance of observers to the Operating Room.     7. I authorize the use of any specimen, organs Signature of Patient:   ___________________________    When the patient is a minor or mentally incompetent to give consent:  Signature of person authorized to consent for patient: ___________________________   Relationship to patient: _____________________ these medicines may increase my risk of anesthetic complications. · If I am allergic to anything or have had a reaction to anesthesia before. 3. I understand how the anesthesia medicine will help me (benefits).     4. I understand that with any type of patient’s representative) and answered their questions. The patient or their representative has agreed to have anesthesia services.     _____________________________________________________________________________  Witness        Date   Time  I have jenifer